# Patient Record
Sex: FEMALE | Race: WHITE | Employment: FULL TIME | URBAN - METROPOLITAN AREA
[De-identification: names, ages, dates, MRNs, and addresses within clinical notes are randomized per-mention and may not be internally consistent; named-entity substitution may affect disease eponyms.]

---

## 2017-01-23 PROBLEM — F41.9 ANXIETY: Status: ACTIVE | Noted: 2017-01-23

## 2017-01-23 PROBLEM — E66.9 OBESITY: Chronic | Status: ACTIVE | Noted: 2017-01-23

## 2017-01-23 PROBLEM — I48.91 ATRIAL FIBRILLATION WITH RVR (HCC): Status: ACTIVE | Noted: 2017-01-23

## 2019-04-21 ENCOUNTER — HOSPITAL ENCOUNTER (EMERGENCY)
Age: 54
Discharge: HOME | End: 2019-04-21
Attending: EMERGENCY MEDICINE
Payer: COMMERCIAL

## 2019-04-21 ENCOUNTER — APPOINTMENT (OUTPATIENT)
Dept: GENERAL RADIOLOGY | Age: 54
End: 2019-04-21
Payer: COMMERCIAL

## 2019-04-21 VITALS
HEIGHT: 67 IN | DIASTOLIC BLOOD PRESSURE: 81 MMHG | RESPIRATION RATE: 16 BRPM | TEMPERATURE: 97.8 F | SYSTOLIC BLOOD PRESSURE: 121 MMHG | BODY MASS INDEX: 45.99 KG/M2 | OXYGEN SATURATION: 97 % | WEIGHT: 293 LBS | HEART RATE: 54 BPM

## 2019-04-21 DIAGNOSIS — I48.0 PAROXYSMAL ATRIAL FIBRILLATION WITH RVR (HCC): Primary | ICD-10-CM

## 2019-04-21 DIAGNOSIS — F41.1 ANXIETY STATE: ICD-10-CM

## 2019-04-21 LAB
A/G RATIO: 1.1 (ref 1.1–2.2)
ALBUMIN SERPL-MCNC: 3.7 G/DL (ref 3.4–5)
ALP BLD-CCNC: 117 U/L (ref 40–129)
ALT SERPL-CCNC: 15 U/L (ref 10–40)
ANION GAP SERPL CALCULATED.3IONS-SCNC: 14 MMOL/L (ref 3–16)
AST SERPL-CCNC: 20 U/L (ref 15–37)
BASOPHILS ABSOLUTE: 0 K/UL (ref 0–0.2)
BASOPHILS RELATIVE PERCENT: 0.7 %
BILIRUB SERPL-MCNC: 0.4 MG/DL (ref 0–1)
BUN BLDV-MCNC: 12 MG/DL (ref 7–20)
CALCIUM SERPL-MCNC: 8.9 MG/DL (ref 8.3–10.6)
CHLORIDE BLD-SCNC: 106 MMOL/L (ref 99–110)
CO2: 21 MMOL/L (ref 21–32)
CREAT SERPL-MCNC: 0.6 MG/DL (ref 0.6–1.1)
EOSINOPHILS ABSOLUTE: 0.1 K/UL (ref 0–0.6)
EOSINOPHILS RELATIVE PERCENT: 1.2 %
GFR AFRICAN AMERICAN: >60
GFR NON-AFRICAN AMERICAN: >60
GLOBULIN: 3.5 G/DL
GLUCOSE BLD-MCNC: 101 MG/DL (ref 70–99)
HCT VFR BLD CALC: 41 % (ref 36–48)
HEMOGLOBIN: 13.7 G/DL (ref 12–16)
LYMPHOCYTES ABSOLUTE: 1.2 K/UL (ref 1–5.1)
LYMPHOCYTES RELATIVE PERCENT: 17.8 %
MAGNESIUM: 2.1 MG/DL (ref 1.8–2.4)
MCH RBC QN AUTO: 27.4 PG (ref 26–34)
MCHC RBC AUTO-ENTMCNC: 33.3 G/DL (ref 31–36)
MCV RBC AUTO: 82.4 FL (ref 80–100)
MONOCYTES ABSOLUTE: 0.4 K/UL (ref 0–1.3)
MONOCYTES RELATIVE PERCENT: 6.5 %
NEUTROPHILS ABSOLUTE: 4.9 K/UL (ref 1.7–7.7)
NEUTROPHILS RELATIVE PERCENT: 73.8 %
PDW BLD-RTO: 15.8 % (ref 12.4–15.4)
PLATELET # BLD: 229 K/UL (ref 135–450)
PMV BLD AUTO: 10.8 FL (ref 5–10.5)
POTASSIUM SERPL-SCNC: 3.8 MMOL/L (ref 3.5–5.1)
RBC # BLD: 4.98 M/UL (ref 4–5.2)
SODIUM BLD-SCNC: 141 MMOL/L (ref 136–145)
TOTAL PROTEIN: 7.2 G/DL (ref 6.4–8.2)
TROPONIN: <0.01 NG/ML
WBC # BLD: 6.7 K/UL (ref 4–11)

## 2019-04-21 PROCEDURE — 83735 ASSAY OF MAGNESIUM: CPT

## 2019-04-21 PROCEDURE — 93005 ELECTROCARDIOGRAM TRACING: CPT | Performed by: EMERGENCY MEDICINE

## 2019-04-21 PROCEDURE — 2500000003 HC RX 250 WO HCPCS: Performed by: EMERGENCY MEDICINE

## 2019-04-21 PROCEDURE — 6370000000 HC RX 637 (ALT 250 FOR IP): Performed by: EMERGENCY MEDICINE

## 2019-04-21 PROCEDURE — 71045 X-RAY EXAM CHEST 1 VIEW: CPT

## 2019-04-21 PROCEDURE — 96365 THER/PROPH/DIAG IV INF INIT: CPT

## 2019-04-21 PROCEDURE — 2580000003 HC RX 258: Performed by: EMERGENCY MEDICINE

## 2019-04-21 PROCEDURE — 80053 COMPREHEN METABOLIC PANEL: CPT

## 2019-04-21 PROCEDURE — 85025 COMPLETE CBC W/AUTO DIFF WBC: CPT

## 2019-04-21 PROCEDURE — 84484 ASSAY OF TROPONIN QUANT: CPT

## 2019-04-21 PROCEDURE — 2500000003 HC RX 250 WO HCPCS

## 2019-04-21 PROCEDURE — 99285 EMERGENCY DEPT VISIT HI MDM: CPT

## 2019-04-21 RX ORDER — LORAZEPAM 0.5 MG/1
0.5 TABLET ORAL ONCE
Status: COMPLETED | OUTPATIENT
Start: 2019-04-21 | End: 2019-04-21

## 2019-04-21 RX ORDER — DILTIAZEM HYDROCHLORIDE 5 MG/ML
INJECTION INTRAVENOUS
Status: COMPLETED
Start: 2019-04-21 | End: 2019-04-21

## 2019-04-21 RX ORDER — DILTIAZEM HYDROCHLORIDE 5 MG/ML
10 INJECTION INTRAVENOUS ONCE
Status: COMPLETED | OUTPATIENT
Start: 2019-04-21 | End: 2019-04-21

## 2019-04-21 RX ORDER — HYDROXYZINE HYDROCHLORIDE 25 MG/1
25 TABLET, FILM COATED ORAL EVERY 6 HOURS PRN
Qty: 12 TABLET | Refills: 0 | Status: SHIPPED | OUTPATIENT
Start: 2019-04-21 | End: 2019-04-21 | Stop reason: SDUPTHER

## 2019-04-21 RX ORDER — HYDROXYZINE HYDROCHLORIDE 25 MG/1
25 TABLET, FILM COATED ORAL EVERY 6 HOURS PRN
Qty: 12 TABLET | Refills: 0 | Status: SHIPPED | OUTPATIENT
Start: 2019-04-21 | End: 2019-04-24

## 2019-04-21 RX ADMIN — DILTIAZEM HYDROCHLORIDE 10 MG/HR: 5 INJECTION INTRAVENOUS at 11:22

## 2019-04-21 RX ADMIN — DILTIAZEM HYDROCHLORIDE 10 MG: 5 INJECTION INTRAVENOUS at 11:18

## 2019-04-21 RX ADMIN — LORAZEPAM 0.5 MG: 0.5 TABLET ORAL at 11:58

## 2019-04-21 ASSESSMENT — PAIN DESCRIPTION - LOCATION: LOCATION: CHEST

## 2019-04-21 ASSESSMENT — PAIN SCALES - GENERAL: PAINLEVEL_OUTOF10: 5

## 2019-04-21 NOTE — ED PROVIDER NOTES
Emergency Department Provider Note     Location: [address] [place] [address]  ED  4/21/2019    I independently performed a history and physical on [place]. All diagnostic, treatment, and disposition decisions were made by myself in conjunction with the mid-level provider. Briefly, this is a 47 y.o. female here for sudden onset of fast and irregular HR at 9:20am.  ppatient has a history of atrial fibrillation status post ablation done in 2017 by [address] [place] [address] Dr lincoln. She states this is probably the third episode since the ablation. She is confident the A. Fib started this morning. She does not normally take any medicine on daily basis. She states she always converts with chemical cardioversion but can take up to 12 hours. ED Triage Vitals [04/21/19 1100]   BP Temp Temp Source Pulse Resp SpO2 Height Weight   (!) 138/100 97.8 °F (36.6 °C) Oral 166 16 99 % 5' 7\" (1.702 m) (!) 301 lb (136.5 kg)        Exam showed WDWN female NAD, ACOx3, fast, irregularly irregular, lungs clear, no LE edema. Repeat exam after patient cardioverted - RRR, no murmur. Patient seen and examined in room 14    EKG  The Ekg interpreted by me in the absence of a cardiologist shows. atrial fibrillation with a rate of 169  Axis is   Normal  QTc is  within an acceptable range  ST segment depression diffusely likely related to rate. No evidence of acute ischemia. Compare to prior EKG dated January 23, 2017, patient is in A. Fib with RVR. EKG#2  The Ekg interpreted by me in the absence of a cardiologist shows. normal sinus rhythm with a rate of 60  Axis is   Normal  QTc is  normal  Intervals and Durations are unremarkable. No specific ST-T wave changes appreciated. ST segment normal now that rate is normal.  No evidence of acute ischemia.    No significant change from prior EKG dated 1/23/17     Diltiazem drip turned off after patient cardioverted      Xr Chest Portable    Result Date: 4/21/2019  EXAMINATION: SINGLE XRAY VIEW OF

## 2019-04-21 NOTE — ED PROVIDER NOTES
[facility]  ED      CHIEF COMPLAINT  Irregular Heart Beat (was making breakfast and felt her heart go into an irregular rhythm)    HISTORY OF PRESENT ILLNESS  Lilly Salas is a 47 y.o. female who presents to the ED complaining of feeling like going into atrial fib. Has a history of a fib and can feel when it happens. Does have a little bit of chest pain and a little bit of heaviness. Also with a little bit of SOB and dizziness. Dr. Candace Watkins is EP cardiology, Dr. Luis Manuel Kapoor is cards. She had an ablation in 2017. Has gone back into a fib 3 times since and has been converted with medications. States she usually has about 8-12 hours of medication before she converts and she stays overnight. She is not on anticoagulation currently. No current meds at home. Reports nausea, no vomiting. The patient is currently rating their pain as 5/10 and describes it as a pressure type of pain. No treatments have been tried prior to arrival in the ED. The patient denies other complaints, modifying factors or associated symptoms. The patient arrived to the ED via private car. Patient is accompanied by family who is bedside for the visit. PAST MEDICAL HISTORY    Past Medical History:   Diagnosis Date    Atrial fib/flutter, transient     Hypertension        SURGICAL HISTORY    Past Surgical History:   Procedure Laterality Date    CHOLECYSTECTOMY      GASTRIC BYPASS SURGERY         CURRENT MEDICATIONS    None    ALLERGIES    Allergies   Allergen Reactions    Codeine     Floxin [Ofloxacin]     Morphine     Penicillins        FAMILY HISTORY    History reviewed. No pertinent family history.     SOCIAL HISTORY    Social History     Socioeconomic History    Marital status: Legally      Spouse name: None    Number of children: None    Years of education: None    Highest education level: None   Occupational History    None   Social Needs    Financial resource strain: None   Clayhole-Graham insecurity:     Worry: None     Inability: None    Transportation needs:     Medical: None     Non-medical: None   Tobacco Use    Smoking status: Never Smoker    Smokeless tobacco: Never Used   Substance and Sexual Activity    Alcohol use: No    Drug use: No    Sexual activity: Yes   Lifestyle    Physical activity:     Days per week: None     Minutes per session: None    Stress: None   Relationships    Social connections:     Talks on phone: None     Gets together: None     Attends [employer] service: None     Active member of club or organization: None     Attends meetings of clubs or organizations: None     Relationship status: None    Intimate partner violence:     Fear of current or ex partner: None     Emotionally abused: None     Physically abused: None     Forced sexual activity: None   Other Topics Concern    None   Social History Narrative    None       REVIEW OF SYSTEMS    10 systems reviewed, pertinent positives per HPI otherwise noted to be negative    PHYSICAL EXAM  Vitals:    04/21/19 1303   BP: 121/81   Pulse: 54   Resp: 16   Temp:    SpO2: 97%       GENERAL: Patient is well-developed, well-nourished. Awake and alert. Cooperative. Resting in bed. No apparent distress. Does appear slightly anxious. HEENT:  Normocephalic, atraumatic. Conjunctiva appear normal. Sclera is non-icteric. External ears are normal.    NECK: Supple with normal ROM. Trachea midline. LUNGS: Equal and symmetric chest rise. Breathing is unlabored. Speaking comfortably in full sentences. Lungs are clear bilaterally to auscultation. Without wheezing, rales, or rhonchi. CADIOVASCULAR: Tachycardic rate and irregular rhythm. Normal S1-S2 sounds. No murmurs, rubs, or gallops. Capillary refill is brisk in all 4 extremities. Bilateral lower extremities are equal in size, there is no swelling observed. There is no tenderness to palpation. There is no erythema observed or warmth palpated.   GI: Soft, nontender, nondistended with positive bowel sounds. No rebound tenderness, guarding or any peritoneal signs. No masses or hepatosplenomegaly   MUSCULOSKELETAL:  No gross deformities or trauma noted. Moving all extremities equally and appropriately. Normal ROM. SKIN: Warm/dry. Skin is intact. No rashes/lesions noted. PSYCHIATRIC: Mood and affect appropriate. Speech is clear and articulate. NEUROLOGIC: Alert and oriented. No focal motor or sensory deficits. Gait was observed and is normal.    LABS  I have reviewed all labs for this visit.    Results for orders placed or performed during the hospital encounter of 04/21/19   CBC Auto Differential   Result Value Ref Range    WBC 6.7 4.0 - 11.0 K/uL    RBC 4.98 4.00 - 5.20 M/uL    Hemoglobin 13.7 12.0 - 16.0 g/dL    Hematocrit 41.0 36.0 - 48.0 %    MCV 82.4 80.0 - 100.0 fL    MCH 27.4 26.0 - 34.0 pg    MCHC 33.3 31.0 - 36.0 g/dL    RDW 15.8 (H) 12.4 - 15.4 %    Platelets 902 114 - 466 K/uL    MPV 10.8 (H) 5.0 - 10.5 fL    Neutrophils % 73.8 %    Lymphocytes % 17.8 %    Monocytes % 6.5 %    Eosinophils % 1.2 %    Basophils % 0.7 %    Neutrophils # 4.9 1.7 - 7.7 K/uL    Lymphocytes # 1.2 1.0 - 5.1 K/uL    Monocytes # 0.4 0.0 - 1.3 K/uL    Eosinophils # 0.1 0.0 - 0.6 K/uL    Basophils # 0.0 0.0 - 0.2 K/uL   Comprehensive Metabolic Panel   Result Value Ref Range    Sodium 141 136 - 145 mmol/L    Potassium 3.8 3.5 - 5.1 mmol/L    Chloride 106 99 - 110 mmol/L    CO2 21 21 - 32 mmol/L    Anion Gap 14 3 - 16    Glucose 101 (H) 70 - 99 mg/dL    BUN 12 7 - 20 mg/dL    CREATININE 0.6 0.6 - 1.1 mg/dL    GFR Non-African American >60 >60    GFR African American >60 >60    Calcium 8.9 8.3 - 10.6 mg/dL    Total Protein 7.2 6.4 - 8.2 g/dL    Alb 3.7 3.4 - 5.0 g/dL    Albumin/Globulin Ratio 1.1 1.1 - 2.2    Total Bilirubin 0.4 0.0 - 1.0 mg/dL    Alkaline Phosphatase 117 40 - 129 U/L    ALT 15 10 - 40 U/L    AST 20 15 - 37 U/L    Globulin 3.5 g/dL   Troponin   Result Value Ref Range    Troponin <0.01 <0.01 ng/mL   Magnesium   Result Value Ref Range    Magnesium 2.10 1.80 - 2.40 mg/dL   EKG 12 Lead   Result Value Ref Range    Ventricular Rate 169 BPM    Atrial Rate 169 BPM    P-R Interval 158 ms    QRS Duration 82 ms    Q-T Interval 276 ms    QTc Calculation (Bazett) 462 ms    P Axis 23 degrees    R Axis 21 degrees    T Axis 255 degrees    Diagnosis       Sinus tachycardia with Premature supraventricular complexes with frequent Premature ventricular complexes and Fusion complexesMarked ST abnormality, possible inferior subendocardial injuryAbnormal ECGWhen compared with ECG of 23-JAN-2017 17:10,Significant changes have occurred   EKG 12 Lead   Result Value Ref Range    Ventricular Rate 60 BPM    Atrial Rate 60 BPM    P-R Interval 156 ms    QRS Duration 84 ms    Q-T Interval 396 ms    QTc Calculation (Bazett) 396 ms    P Axis 54 degrees    R Axis 11 degrees    T Axis 35 degrees    Diagnosis       Normal sinus rhythmNormal ECGWhen compared with ECG of 21-APR-2019 11:03,Significant changes have occurred       RADIOLOGY    Xr Chest Portable    Result Date: 4/21/2019  EXAMINATION: SINGLE XRAY VIEW OF THE CHEST 4/21/2019 11:44 am COMPARISON: 01/22/2017 HISTORY: ORDERING SYSTEM PROVIDED HISTORY: sudden onset of a-fib, SOB TECHNOLOGIST PROVIDED HISTORY: Reason for exam:->sudden onset of a-fib, SOB Ordering Physician Provided Reason for Exam: sudden onset of a-fib, SOB Acuity: Unknown Type of Exam: Unknown FINDINGS: The heart size is within normal limits. There is no pneumothorax, edema or focal consolidation. A calcified granuloma is re-identified on the right. The bony thorax is grossly intact. No evidence of acute cardiopulmonary disease. ED COURSE/MDM  Patient seen and evaluated. Old records reviewed. Diagnostic testing reviewed and results discussed.       I have seen and evaluated this patient with supervising physician: Naye Nicholas MD. We thoroughly discussed the history, physical exam, diagnostic testing, emergency department course, plan and disposition. Paulie Hsieh presented to the ED today with above noted complaints. Physical exam is unremarkable except for patient is noted to be an atrial fib with an RVR rates fluctuating between 120-160. Patient was started on a Cardizem bolus and drip while in the ED. CBC is without evidence for systemic infection as there is no leukocytosis or differential shift. H&H is normal and stable. Platelets are within normal limits. CMP is without electrolyte abnormality. No evidence of kidney or liver dysfunction. Magnesium level is normal at 2.1. EKG initially shows atrial fibrillation with a rate of 169. After patient converted a repeat EKG was obtained and shows normal sinus rhythm with a rate of 60. Troponin negative  CXR is without acute findings. Pt was given the following medications or treatments in the ED:   Medications   diltiazem injection 10 mg (10 mg Intravenous Given 4/21/19 1118)     Followed by   diltiazem 125 mg in dextrose 5 % 125 mL infusion (0 mg/hr Intravenous Stopped 4/21/19 1158)   LORazepam (ATIVAN) tablet 0.5 mg (0.5 mg Oral Given 4/21/19 1158)     Patient has a history of paroxysmal atrial fibrillation, she had an ablation in 2017. States she has had approximately 3 episodes since then where she is gone into A. fib, these have been converted with medications. While patient was in the ED after the Cardizem bolus and drip were given she converted back into normal sinus rhythm. As these are occurring infrequently we will not make any medication adjustments at this time. Patient was advised to follow-up with her cardiologist, instructed to call tomorrow to schedule follow-up appointment. Patient will be discharged with a prescription for Vistaril as she was somewhat anxious while here in the ED. At this point I do not feel the patient requires further work up and it is reasonable to discharge the patient.      Please refer as soon as possible for a visit       Your cardiologist with [facility]    Schedule an appointment as soon as possible for a visit         DISPOSITION  Patient was discharged to home in good condition. Comment: Please note this report has been produced using speech recognition software and may contain errors related to that system including errors in grammar, punctuation, and spelling, as well as words and phrases that may be inappropriate. If there are any questions or concerns please feel free to contact the dictating provider for clarification.         Norma Bennett, LORE - CNP  04/21/19 7229

## 2019-04-21 NOTE — ED NOTES
Pt converted to sinus rhythm. Dr. Everardo Olguin notified and EKG ordered.      Nicole Rojas RN  04/21/19 6224

## 2019-04-22 LAB
EKG ATRIAL RATE: 169 BPM
EKG ATRIAL RATE: 60 BPM
EKG DIAGNOSIS: NORMAL
EKG DIAGNOSIS: NORMAL
EKG P AXIS: 23 DEGREES
EKG P AXIS: 54 DEGREES
EKG P-R INTERVAL: 156 MS
EKG P-R INTERVAL: 158 MS
EKG Q-T INTERVAL: 276 MS
EKG Q-T INTERVAL: 396 MS
EKG QRS DURATION: 82 MS
EKG QRS DURATION: 84 MS
EKG QTC CALCULATION (BAZETT): 396 MS
EKG QTC CALCULATION (BAZETT): 462 MS
EKG R AXIS: 11 DEGREES
EKG R AXIS: 21 DEGREES
EKG T AXIS: 255 DEGREES
EKG T AXIS: 35 DEGREES
EKG VENTRICULAR RATE: 169 BPM
EKG VENTRICULAR RATE: 60 BPM

## 2019-04-22 PROCEDURE — 93010 ELECTROCARDIOGRAM REPORT: CPT | Performed by: INTERNAL MEDICINE

## 2020-04-28 ENCOUNTER — HOSPITAL ENCOUNTER (EMERGENCY)
Age: 55
Discharge: HOME | End: 2020-04-28
Attending: EMERGENCY MEDICINE
Payer: COMMERCIAL

## 2020-04-28 ENCOUNTER — APPOINTMENT (OUTPATIENT)
Dept: GENERAL RADIOLOGY | Age: 55
End: 2020-04-28
Payer: COMMERCIAL

## 2020-04-28 VITALS
SYSTOLIC BLOOD PRESSURE: 152 MMHG | OXYGEN SATURATION: 100 % | HEART RATE: 75 BPM | HEIGHT: 67 IN | WEIGHT: 293 LBS | DIASTOLIC BLOOD PRESSURE: 90 MMHG | BODY MASS INDEX: 45.99 KG/M2 | TEMPERATURE: 98.6 F | RESPIRATION RATE: 17 BRPM

## 2020-04-28 LAB
A/G RATIO: 1.2 (ref 1.1–2.2)
ALBUMIN SERPL-MCNC: 4.1 G/DL (ref 3.4–5)
ALP BLD-CCNC: 148 U/L (ref 40–129)
ALT SERPL-CCNC: 13 U/L (ref 10–40)
ANION GAP SERPL CALCULATED.3IONS-SCNC: 9 MMOL/L (ref 3–16)
AST SERPL-CCNC: 19 U/L (ref 15–37)
BASOPHILS ABSOLUTE: 0 K/UL (ref 0–0.2)
BASOPHILS RELATIVE PERCENT: 0.6 %
BILIRUB SERPL-MCNC: 0.3 MG/DL (ref 0–1)
BUN BLDV-MCNC: 21 MG/DL (ref 7–20)
CALCIUM SERPL-MCNC: 9.4 MG/DL (ref 8.3–10.6)
CHLORIDE BLD-SCNC: 106 MMOL/L (ref 99–110)
CO2: 26 MMOL/L (ref 21–32)
CREAT SERPL-MCNC: 0.6 MG/DL (ref 0.6–1.1)
EKG ATRIAL RATE: 87 BPM
EKG DIAGNOSIS: NORMAL
EKG P AXIS: 41 DEGREES
EKG P-R INTERVAL: 146 MS
EKG Q-T INTERVAL: 360 MS
EKG QRS DURATION: 84 MS
EKG QTC CALCULATION (BAZETT): 433 MS
EKG R AXIS: 15 DEGREES
EKG T AXIS: 42 DEGREES
EKG VENTRICULAR RATE: 87 BPM
EOSINOPHILS ABSOLUTE: 0.1 K/UL (ref 0–0.6)
EOSINOPHILS RELATIVE PERCENT: 1.7 %
GFR AFRICAN AMERICAN: >60
GFR NON-AFRICAN AMERICAN: >60
GLOBULIN: 3.3 G/DL
GLUCOSE BLD-MCNC: 103 MG/DL (ref 70–99)
HCT VFR BLD CALC: 41.4 % (ref 36–48)
HEMOGLOBIN: 13.3 G/DL (ref 12–16)
LYMPHOCYTES ABSOLUTE: 2.6 K/UL (ref 1–5.1)
LYMPHOCYTES RELATIVE PERCENT: 32.1 %
MCH RBC QN AUTO: 26.4 PG (ref 26–34)
MCHC RBC AUTO-ENTMCNC: 32.1 G/DL (ref 31–36)
MCV RBC AUTO: 82.4 FL (ref 80–100)
MONOCYTES ABSOLUTE: 0.6 K/UL (ref 0–1.3)
MONOCYTES RELATIVE PERCENT: 7.2 %
NEUTROPHILS ABSOLUTE: 4.7 K/UL (ref 1.7–7.7)
NEUTROPHILS RELATIVE PERCENT: 58.4 %
PDW BLD-RTO: 15.4 % (ref 12.4–15.4)
PLATELET # BLD: 235 K/UL (ref 135–450)
PMV BLD AUTO: 10.7 FL (ref 5–10.5)
POTASSIUM REFLEX MAGNESIUM: 3.8 MMOL/L (ref 3.5–5.1)
RBC # BLD: 5.02 M/UL (ref 4–5.2)
SODIUM BLD-SCNC: 141 MMOL/L (ref 136–145)
TOTAL PROTEIN: 7.4 G/DL (ref 6.4–8.2)
TROPONIN: <0.01 NG/ML
WBC # BLD: 8 K/UL (ref 4–11)

## 2020-04-28 PROCEDURE — 93005 ELECTROCARDIOGRAM TRACING: CPT

## 2020-04-28 PROCEDURE — 80053 COMPREHEN METABOLIC PANEL: CPT

## 2020-04-28 PROCEDURE — 6370000000 HC RX 637 (ALT 250 FOR IP): Performed by: EMERGENCY MEDICINE

## 2020-04-28 PROCEDURE — 85025 COMPLETE CBC W/AUTO DIFF WBC: CPT

## 2020-04-28 PROCEDURE — 99285 EMERGENCY DEPT VISIT HI MDM: CPT

## 2020-04-28 PROCEDURE — 71045 X-RAY EXAM CHEST 1 VIEW: CPT

## 2020-04-28 PROCEDURE — 84484 ASSAY OF TROPONIN QUANT: CPT

## 2020-04-28 RX ORDER — ALPRAZOLAM 1 MG/1
1 TABLET ORAL 2 TIMES DAILY PRN
Qty: 5 TABLET | Refills: 0 | Status: SHIPPED | OUTPATIENT
Start: 2020-04-28 | End: 2020-04-30

## 2020-04-28 RX ORDER — ALPRAZOLAM 1 MG/1
1 TABLET ORAL ONCE
Status: COMPLETED | OUTPATIENT
Start: 2020-04-28 | End: 2020-04-28

## 2020-04-28 RX ADMIN — ALPRAZOLAM 1 MG: 1 TABLET ORAL at 01:33

## 2020-04-28 ASSESSMENT — PAIN DESCRIPTION - ONSET: ONSET: ON-GOING

## 2020-04-28 ASSESSMENT — PAIN SCALES - GENERAL
PAINLEVEL_OUTOF10: 3
PAINLEVEL_OUTOF10: 0
PAINLEVEL_OUTOF10: 0

## 2020-04-28 ASSESSMENT — PAIN DESCRIPTION - DESCRIPTORS: DESCRIPTORS: TINGLING

## 2020-04-28 ASSESSMENT — PAIN DESCRIPTION - PROGRESSION: CLINICAL_PROGRESSION: NOT CHANGED

## 2020-04-28 ASSESSMENT — PAIN DESCRIPTION - ORIENTATION: ORIENTATION: LEFT

## 2020-04-28 ASSESSMENT — PAIN DESCRIPTION - LOCATION: LOCATION: CHEST;ARM

## 2020-04-28 ASSESSMENT — PAIN DESCRIPTION - FREQUENCY: FREQUENCY: CONTINUOUS

## 2020-04-28 ASSESSMENT — PAIN DESCRIPTION - PAIN TYPE: TYPE: ACUTE PAIN

## 2020-04-28 NOTE — ED PROVIDER NOTES
dictating, effort is made to correct spelling/grammar errors. If there are any questions or concerns please feel free to contact the dictating provider for clarification.      Concepcion Black DO  ATTENDING, EMERGENCY MEDICINE        Ishmael Horvath DO  05/03/20 2626

## 2024-07-10 ENCOUNTER — APPOINTMENT (OUTPATIENT)
Dept: GENERAL RADIOLOGY | Age: 59
End: 2024-07-10
Payer: COMMERCIAL

## 2024-07-10 ENCOUNTER — HOSPITAL ENCOUNTER (OUTPATIENT)
Age: 59
Setting detail: OBSERVATION
Discharge: HOME | End: 2024-07-11
Attending: EMERGENCY MEDICINE | Admitting: HOSPITALIST
Payer: COMMERCIAL

## 2024-07-10 DIAGNOSIS — R07.89 CHEST PRESSURE: ICD-10-CM

## 2024-07-10 DIAGNOSIS — R03.0 ELEVATED BLOOD PRESSURE READING: ICD-10-CM

## 2024-07-10 DIAGNOSIS — I48.91 ATRIAL FIBRILLATION WITH RAPID VENTRICULAR RESPONSE (HCC): Primary | ICD-10-CM

## 2024-07-10 LAB
ALBUMIN SERPL-MCNC: 3.7 G/DL (ref 3.4–5)
ALBUMIN/GLOB SERPL: 1.1 {RATIO} (ref 1.1–2.2)
ALP SERPL-CCNC: 117 U/L (ref 40–129)
ALT SERPL-CCNC: 17 U/L (ref 10–40)
ANION GAP SERPL CALCULATED.3IONS-SCNC: 10 MMOL/L (ref 3–16)
AST SERPL-CCNC: 25 U/L (ref 15–37)
BASOPHILS # BLD: 0.1 K/UL (ref 0–0.2)
BASOPHILS NFR BLD: 1.1 %
BILIRUB SERPL-MCNC: 0.3 MG/DL (ref 0–1)
BUN SERPL-MCNC: 14 MG/DL (ref 7–20)
CALCIUM SERPL-MCNC: 9 MG/DL (ref 8.3–10.6)
CHLORIDE SERPL-SCNC: 100 MMOL/L (ref 99–110)
CO2 SERPL-SCNC: 23 MMOL/L (ref 21–32)
CREAT SERPL-MCNC: 0.6 MG/DL (ref 0.6–1.1)
DEPRECATED RDW RBC AUTO: 15.6 % (ref 12.4–15.4)
EKG DIAGNOSIS: NORMAL
EKG Q-T INTERVAL: 282 MS
EKG QRS DURATION: 82 MS
EKG QTC CALCULATION (BAZETT): 470 MS
EKG R AXIS: 8 DEGREES
EKG T AXIS: -59 DEGREES
EKG VENTRICULAR RATE: 167 BPM
EOSINOPHIL # BLD: 0.1 K/UL (ref 0–0.6)
EOSINOPHIL NFR BLD: 1.5 %
GFR SERPLBLD CREATININE-BSD FMLA CKD-EPI: >90 ML/MIN/{1.73_M2}
GLUCOSE SERPL-MCNC: 89 MG/DL (ref 70–99)
HCT VFR BLD AUTO: 39.8 % (ref 36–48)
HGB BLD-MCNC: 13.1 G/DL (ref 12–16)
LYMPHOCYTES # BLD: 1.9 K/UL (ref 1–5.1)
LYMPHOCYTES NFR BLD: 31.3 %
MAGNESIUM SERPL-MCNC: 2 MG/DL (ref 1.8–2.4)
MCH RBC QN AUTO: 29.8 PG (ref 26–34)
MCHC RBC AUTO-ENTMCNC: 32.9 G/DL (ref 31–36)
MCV RBC AUTO: 90.5 FL (ref 80–100)
MONOCYTES # BLD: 0.5 K/UL (ref 0–1.3)
MONOCYTES NFR BLD: 7.6 %
NEUTROPHILS # BLD: 3.5 K/UL (ref 1.7–7.7)
NEUTROPHILS NFR BLD: 58.5 %
NT-PROBNP SERPL-MCNC: 324 PG/ML (ref 0–124)
PHOSPHATE SERPL-MCNC: 2.7 MG/DL (ref 2.5–4.9)
PLATELET # BLD AUTO: 180 K/UL (ref 135–450)
PMV BLD AUTO: 11.7 FL (ref 5–10.5)
POTASSIUM SERPL-SCNC: 4 MMOL/L (ref 3.5–5.1)
PROT SERPL-MCNC: 7.2 G/DL (ref 6.4–8.2)
RBC # BLD AUTO: 4.4 M/UL (ref 4–5.2)
SODIUM SERPL-SCNC: 133 MMOL/L (ref 136–145)
TROPONIN, HIGH SENSITIVITY: <6 NG/L (ref 0–14)
TROPONIN, HIGH SENSITIVITY: <6 NG/L (ref 0–14)
WBC # BLD AUTO: 6 K/UL (ref 4–11)

## 2024-07-10 PROCEDURE — 2500000003 HC RX 250 WO HCPCS: Performed by: EMERGENCY MEDICINE

## 2024-07-10 PROCEDURE — 96366 THER/PROPH/DIAG IV INF ADDON: CPT

## 2024-07-10 PROCEDURE — 93005 ELECTROCARDIOGRAM TRACING: CPT | Performed by: EMERGENCY MEDICINE

## 2024-07-10 PROCEDURE — 2580000003 HC RX 258: Performed by: HOSPITALIST

## 2024-07-10 PROCEDURE — 96361 HYDRATE IV INFUSION ADD-ON: CPT

## 2024-07-10 PROCEDURE — 6370000000 HC RX 637 (ALT 250 FOR IP): Performed by: HOSPITALIST

## 2024-07-10 PROCEDURE — 84443 ASSAY THYROID STIM HORMONE: CPT

## 2024-07-10 PROCEDURE — 6360000002 HC RX W HCPCS: Performed by: HOSPITALIST

## 2024-07-10 PROCEDURE — 85025 COMPLETE CBC W/AUTO DIFF WBC: CPT

## 2024-07-10 PROCEDURE — 84484 ASSAY OF TROPONIN QUANT: CPT

## 2024-07-10 PROCEDURE — G0378 HOSPITAL OBSERVATION PER HR: HCPCS

## 2024-07-10 PROCEDURE — 71045 X-RAY EXAM CHEST 1 VIEW: CPT

## 2024-07-10 PROCEDURE — 96365 THER/PROPH/DIAG IV INF INIT: CPT

## 2024-07-10 PROCEDURE — 84100 ASSAY OF PHOSPHORUS: CPT

## 2024-07-10 PROCEDURE — 96372 THER/PROPH/DIAG INJ SC/IM: CPT

## 2024-07-10 PROCEDURE — 83880 ASSAY OF NATRIURETIC PEPTIDE: CPT

## 2024-07-10 PROCEDURE — 93005 ELECTROCARDIOGRAM TRACING: CPT | Performed by: HOSPITALIST

## 2024-07-10 PROCEDURE — 2500000003 HC RX 250 WO HCPCS

## 2024-07-10 PROCEDURE — 83735 ASSAY OF MAGNESIUM: CPT

## 2024-07-10 PROCEDURE — 93010 ELECTROCARDIOGRAM REPORT: CPT | Performed by: INTERNAL MEDICINE

## 2024-07-10 PROCEDURE — 2580000003 HC RX 258: Performed by: EMERGENCY MEDICINE

## 2024-07-10 PROCEDURE — 99285 EMERGENCY DEPT VISIT HI MDM: CPT

## 2024-07-10 PROCEDURE — 6370000000 HC RX 637 (ALT 250 FOR IP)

## 2024-07-10 PROCEDURE — 96374 THER/PROPH/DIAG INJ IV PUSH: CPT

## 2024-07-10 PROCEDURE — 2060000000 HC ICU INTERMEDIATE R&B

## 2024-07-10 PROCEDURE — 80053 COMPREHEN METABOLIC PANEL: CPT

## 2024-07-10 RX ORDER — ONDANSETRON 4 MG/1
4 TABLET, ORALLY DISINTEGRATING ORAL EVERY 8 HOURS PRN
Status: DISCONTINUED | OUTPATIENT
Start: 2024-07-10 | End: 2024-07-11 | Stop reason: HOSPADM

## 2024-07-10 RX ORDER — SODIUM CHLORIDE 0.9 % (FLUSH) 0.9 %
5-40 SYRINGE (ML) INJECTION EVERY 12 HOURS SCHEDULED
Status: DISCONTINUED | OUTPATIENT
Start: 2024-07-10 | End: 2024-07-11 | Stop reason: HOSPADM

## 2024-07-10 RX ORDER — LORAZEPAM 1 MG/1
1 TABLET ORAL ONCE
Status: COMPLETED | OUTPATIENT
Start: 2024-07-10 | End: 2024-07-10

## 2024-07-10 RX ORDER — DILTIAZEM HYDROCHLORIDE 5 MG/ML
5 INJECTION INTRAVENOUS ONCE
Status: COMPLETED | OUTPATIENT
Start: 2024-07-10 | End: 2024-07-10

## 2024-07-10 RX ORDER — FUROSEMIDE 10 MG/ML
20 INJECTION INTRAMUSCULAR; INTRAVENOUS 2 TIMES DAILY
Status: DISCONTINUED | OUTPATIENT
Start: 2024-07-10 | End: 2024-07-11 | Stop reason: HOSPADM

## 2024-07-10 RX ORDER — 0.9 % SODIUM CHLORIDE 0.9 %
500 INTRAVENOUS SOLUTION INTRAVENOUS ONCE
Status: COMPLETED | OUTPATIENT
Start: 2024-07-10 | End: 2024-07-10

## 2024-07-10 RX ORDER — ENOXAPARIN SODIUM 100 MG/ML
30 INJECTION SUBCUTANEOUS 2 TIMES DAILY
Status: DISCONTINUED | OUTPATIENT
Start: 2024-07-10 | End: 2024-07-10 | Stop reason: ALTCHOICE

## 2024-07-10 RX ORDER — POTASSIUM CHLORIDE 20 MEQ/1
40 TABLET, EXTENDED RELEASE ORAL PRN
Status: DISCONTINUED | OUTPATIENT
Start: 2024-07-10 | End: 2024-07-11 | Stop reason: HOSPADM

## 2024-07-10 RX ORDER — POTASSIUM CHLORIDE 7.45 MG/ML
10 INJECTION INTRAVENOUS PRN
Status: DISCONTINUED | OUTPATIENT
Start: 2024-07-10 | End: 2024-07-11 | Stop reason: HOSPADM

## 2024-07-10 RX ORDER — SODIUM CHLORIDE 0.9 % (FLUSH) 0.9 %
5-40 SYRINGE (ML) INJECTION PRN
Status: DISCONTINUED | OUTPATIENT
Start: 2024-07-10 | End: 2024-07-11 | Stop reason: HOSPADM

## 2024-07-10 RX ORDER — ACETAMINOPHEN 325 MG/1
650 TABLET ORAL EVERY 6 HOURS PRN
Status: DISCONTINUED | OUTPATIENT
Start: 2024-07-10 | End: 2024-07-11 | Stop reason: HOSPADM

## 2024-07-10 RX ORDER — ONDANSETRON 2 MG/ML
4 INJECTION INTRAMUSCULAR; INTRAVENOUS EVERY 6 HOURS PRN
Status: DISCONTINUED | OUTPATIENT
Start: 2024-07-10 | End: 2024-07-11 | Stop reason: HOSPADM

## 2024-07-10 RX ORDER — ACETAMINOPHEN 650 MG/1
650 SUPPOSITORY RECTAL EVERY 6 HOURS PRN
Status: DISCONTINUED | OUTPATIENT
Start: 2024-07-10 | End: 2024-07-11 | Stop reason: HOSPADM

## 2024-07-10 RX ORDER — MAGNESIUM SULFATE IN WATER 40 MG/ML
2000 INJECTION, SOLUTION INTRAVENOUS PRN
Status: DISCONTINUED | OUTPATIENT
Start: 2024-07-10 | End: 2024-07-11 | Stop reason: HOSPADM

## 2024-07-10 RX ORDER — CHOLECALCIFEROL (VITAMIN D3) 1250 MCG
5000 CAPSULE ORAL WEEKLY
COMMUNITY
Start: 2024-04-18

## 2024-07-10 RX ORDER — SODIUM CHLORIDE 9 MG/ML
INJECTION, SOLUTION INTRAVENOUS PRN
Status: DISCONTINUED | OUTPATIENT
Start: 2024-07-10 | End: 2024-07-11 | Stop reason: HOSPADM

## 2024-07-10 RX ORDER — POLYETHYLENE GLYCOL 3350 17 G/17G
17 POWDER, FOR SOLUTION ORAL DAILY PRN
Status: DISCONTINUED | OUTPATIENT
Start: 2024-07-10 | End: 2024-07-11 | Stop reason: HOSPADM

## 2024-07-10 RX ADMIN — DILTIAZEM HYDROCHLORIDE 5 MG: 5 INJECTION, SOLUTION INTRAVENOUS at 11:14

## 2024-07-10 RX ADMIN — FUROSEMIDE 20 MG: 10 INJECTION, SOLUTION INTRAMUSCULAR; INTRAVENOUS at 18:19

## 2024-07-10 RX ADMIN — SODIUM CHLORIDE 500 ML: 9 INJECTION, SOLUTION INTRAVENOUS at 10:56

## 2024-07-10 RX ADMIN — APIXABAN 5 MG: 5 TABLET, FILM COATED ORAL at 20:11

## 2024-07-10 RX ADMIN — LORAZEPAM 1 MG: 1 TABLET ORAL at 12:04

## 2024-07-10 RX ADMIN — ENOXAPARIN SODIUM 30 MG: 100 INJECTION SUBCUTANEOUS at 13:41

## 2024-07-10 RX ADMIN — DILTIAZEM HYDROCHLORIDE 5 MG: 5 INJECTION, SOLUTION INTRAVENOUS at 10:52

## 2024-07-10 RX ADMIN — SODIUM CHLORIDE 5 MG/HR: 900 INJECTION, SOLUTION INTRAVENOUS at 11:33

## 2024-07-10 RX ADMIN — Medication 10 ML: at 20:13

## 2024-07-10 ASSESSMENT — PAIN DESCRIPTION - DESCRIPTORS
DESCRIPTORS: DISCOMFORT
DESCRIPTORS: TIGHTNESS

## 2024-07-10 ASSESSMENT — PAIN SCALES - GENERAL
PAINLEVEL_OUTOF10: 5
PAINLEVEL_OUTOF10: 8

## 2024-07-10 ASSESSMENT — LIFESTYLE VARIABLES
HOW OFTEN DO YOU HAVE A DRINK CONTAINING ALCOHOL: NEVER
HOW MANY STANDARD DRINKS CONTAINING ALCOHOL DO YOU HAVE ON A TYPICAL DAY: PATIENT DOES NOT DRINK

## 2024-07-10 ASSESSMENT — PAIN DESCRIPTION - LOCATION: LOCATION: CHEST

## 2024-07-10 ASSESSMENT — PAIN - FUNCTIONAL ASSESSMENT: PAIN_FUNCTIONAL_ASSESSMENT: 0-10

## 2024-07-10 NOTE — ED NOTES
Ms. Morales is a 59 y.o. female who had concerns including Chest Pain (Chest pain that started today. Patient states she thinks she went into afib yesterday but converted, started having chest pain after the episode yesterday. Hx afib. Received 324 asa PTA. ).    Chief Complaint   Patient presents with    Chest Pain     Chest pain that started today. Patient states she thinks she went into afib yesterday but converted, started having chest pain after the episode yesterday. Hx afib. Received 324 asa PTA.        She is being admitted for:    Atrial fibrillation with RVR (HCC)    Her ED problem list included:    1. Atrial fibrillation with rapid ventricular response (HCC)    2. Chest pressure    3. Elevated blood pressure reading        Past Medical History:   Diagnosis Date    Atrial fib/flutter, transient     Hypertension        Past Surgical History:   Procedure Laterality Date    CHOLECYSTECTOMY      GASTRIC BYPASS SURGERY         Her recent abnormal labs were:    Labs Reviewed   CBC WITH AUTO DIFFERENTIAL - Abnormal; Notable for the following components:       Result Value    RDW 15.6 (*)     MPV 11.7 (*)     All other components within normal limits   COMPREHENSIVE METABOLIC PANEL W/ REFLEX TO MG FOR LOW K - Abnormal; Notable for the following components:    Sodium 133 (*)     All other components within normal limits   TROPONIN   TROPONIN   PHOSPHORUS   MAGNESIUM   BRAIN NATRIURETIC PEPTIDE   TSH WITH REFLEX       Her vital signs for the encounter were:    Patient Vitals for the past 24 hrs:   BP Temp Temp src Pulse Resp SpO2 Height Weight   07/10/24 1723 92/68 -- -- (!) 48 20 97 % -- --   07/10/24 1707 94/65 -- -- 52 16 100 % -- --   07/10/24 1654 103/67 -- -- 58 22 98 % -- --   07/10/24 1649 -- -- -- (!) 48 15 100 % -- --   07/10/24 1644 -- -- -- (!) 106 17 100 % -- --   07/10/24 1620 105/80 -- -- 89 22 98 % -- --   07/10/24 1611 -- -- -- 95 -- -- -- --   07/10/24 1609 -- -- -- (!) 120 13 97 % -- --   07/10/24

## 2024-07-10 NOTE — ED NOTES
General Cardiology   Progress Note -  Team One   Sienaguillermina Bowerton 63 y.o. female MRN: [id number]    Unit/Bed#: S -Siobhan Encounter: [id number]    Assessment  1. Chest pain, elevated troponin / Likely NSTEMI type I  -No current complaints of chest pain.  HS troponin levels elevated; 48--[phone]--2074  ECG on admission demonstrated NSR with ST depression in leads I/aVL, and V2.  -On aspirin 81 mg daily + clopidogrel 75 mg daily.  -On atorvastatin 80 mg daily  -On metoprolol tartrate 25 mg twice daily  -On IV heparin GTT ACS low protocol.  2. Hypertension  -Average /76 last recorded 131/78, HR 62.  -Outpatient BP regimen; PERRL 10 mg daily  -Inpatient BP regimen; metoprolol tartrate 25 mg twice daily  3. Hyperlipidemia  -Lipid profile 6/2/2024; cholesterol 133, triglyceride 88, HDL 39 and LDL calculated 76.  -On atorvastatin 40 mg daily (home dose)    Plan  -Patient feels well this a.m., denies any specific cardiac or respiratory complaints.  -Tentative plan for [facility] procedure today for definitive ischemic evaluation.  Maintain n.p.o. status, IV fluids for hydration.  Continue IV heparin GTT ACS low protocol.  -Follow-up TTE imaging results.  Telemetry reviewed, maintaining sinus bradycardia with heart rates in the mid 50s.  Decrease metoprolol from 25 to 12.5 mg twice daily.   -Hold lisinopril.  -Continue DAPT with low-dose aspirin/clopidogrel 75 mg daily.  Increase atorvastatin to 80 mg daily, previously had been on 40 mg PTA, LDL suboptimal at 76.  -Continue to monitor on telemetry.    Subjective  Review of Systems   Constitutional: Negative for chills, fever and malaise/fatigue.   Eyes:  Negative for visual disturbance.   Cardiovascular:  Negative for chest pain, dyspnea on exertion, leg swelling, orthopnea and palpitations.   Respiratory:  Negative for cough and shortness of breath.    Neurological:  Negative for dizziness, headaches and light-headedness.       Objective:   Physical Exam  Vitals  Meal tray ordered for patient at this time. Diet order in place.    "and nursing note reviewed.   Constitutional:       General: She is not in acute distress.     Appearance: She is not diaphoretic.   HENT:      Head: Normocephalic and atraumatic.   Eyes:      General: No scleral icterus.  Cardiovascular:      Rate and Rhythm: Regular rhythm. Bradycardia present.      Pulses: Normal pulses.      Heart sounds: Normal heart sounds.   Pulmonary:      Effort: Pulmonary effort is normal.      Breath sounds: Normal breath sounds. No wheezing or rales.   Abdominal:      Palpations: Abdomen is soft.   Musculoskeletal:      Right lower leg: No edema.      Left lower leg: No edema.   Skin:     General: Skin is warm and dry.      Capillary Refill: Capillary refill takes less than 2 seconds.   Neurological:      General: No focal deficit present.      Mental Status: She is alert and oriented to person, place, and time.   Psychiatric:         Mood and Affect: Mood normal.         Vitals: Blood pressure 131/78, pulse 62, temperature 98.2 °F (36.8 °C), resp. rate 18, height 5' 2\" (1.575 m), weight 64.9 kg (143 lb), SpO2 94%.,     Body mass index is 26.16 kg/m².,   Systolic (24hrs), Av , Min:124 , Max:136     Diastolic (24hrs), Av, Min:74, Max:78      Intake/Output Summary (Last 24 hours) at 6/3/2024 1112  Last data filed at 2024 2257  Gross per 24 hour   Intake 120 ml   Output 1475 ml   Net -1355 ml     Weight (last 2 days)       Date/Time Weight    24 0836 64.9 (143)    24 1629 64.9 (143.08)            LABORATORY RESULTS      CBC with diff:   Results from last 7 days   Lab Units 24  0454 24  0508 24  0037 24  2232 24  1626   WBC Thousand/uL 9.05 11.64*  --  10.56* 8.15   HEMOGLOBIN g/dL 11.6 11.8  --  11.9 13.3   HEMATOCRIT % 35.2 34.6*  --  34.2* 39.3   MCV fL 93 90  --  89 91   PLATELETS Thousands/uL 228 247 233 246 264   RBC Million/uL 3.80* 3.84  --  3.84 4.31   MCH pg 30.5 30.7  --  31.0 30.9   MCHC g/dL 33.0 34.1  --  34.8 33.8   RDW % " "12.6 12.1  --  12.0 12.0   MPV fL 10.1 9.7 9.8 9.6 9.9   NRBC AUTO /100 WBCs  --   --   --   --  0       CMP:  Results from last 7 days   Lab Units 06/03/24  0454 06/02/24  0508 06/01/24  1626   POTASSIUM mmol/L 3.6 3.2* 3.8   CHLORIDE mmol/L 106 101 101   CO2 mmol/L 28 25 24   BUN mg/dL 10 13 14   CREATININE mg/dL 0.77 0.70 0.74   CALCIUM mg/dL 8.8 9.0 9.9   AST U/L  --   --  22   ALT U/L  --   --  21   ALK PHOS U/L  --   --  53   EGFR ml/min/1.73sq m 82 92 86       BMP:  Results from last 7 days   Lab Units 06/03/24  0454 06/02/24  0508 06/01/24  1626   POTASSIUM mmol/L 3.6 3.2* 3.8   CHLORIDE mmol/L 106 101 101   CO2 mmol/L 28 25 24   BUN mg/dL 10 13 14   CREATININE mg/dL 0.77 0.70 0.74   CALCIUM mg/dL 8.8 9.0 9.9       No results found for: \"NTBNP\"     Results from last 7 days   Lab Units 06/02/24  0508 06/01/24  2356   MAGNESIUM mg/dL 1.6* 1.6*       Results from last 7 days   Lab Units 06/02/24  0508   HEMOGLOBIN A1C % 5.8*             Results from last 7 days   Lab Units 06/01/24  2232   INR  1.06       Lipid Profile:   No results found for: \"CHOL\"  Lab Results   Component Value Date    HDL 39 (L) 06/02/2024    HDL 32 (L) 09/30/2021    HDL 31 (L) 06/04/2021     Lab Results   Component Value Date    LDLCALC 76 06/02/2024    LDLCALC 79 09/30/2021    LDLCALC 159 (H) 06/04/2021     Lab Results   Component Value Date    TRIG 88 06/02/2024    TRIG 175 (H) 09/30/2021    TRIG 351 (H) 06/04/2021       Cardiac testing:   No results found for this or any previous visit.    No results found for this or any previous visit.    No results found for this or any previous visit.    No valid procedures specified.  No results found for this or any previous visit.      Meds/Allergies   all current active meds have been reviewed and current meds:   Current Facility-Administered Medications   Medication Dose Route Frequency    acetaminophen (TYLENOL) tablet 975 mg  975 mg Oral Q6H PRN    ascorbic acid (VITAMIN C) tablet 1,000 mg  " 1,000 mg Oral Daily    aspirin chewable tablet 81 mg  81 mg Oral Daily    atorvastatin (LIPITOR) tablet 80 mg  80 mg Oral Daily    Cholecalciferol (VITAMIN D3) tablet 1,000 Units  1,000 Units Oral Daily    clopidogrel (PLAVIX) tablet 75 mg  75 mg Oral Daily    dicyclomine (BENTYL) tablet 20 mg  20 mg Oral 4x Daily PRN    heparin (porcine) 25,000 units in 0.45% NaCl 250 mL infusion (premix)  3-20 Units/kg/hr (Order-Specific) Intravenous Titrated    heparin (porcine) injection 1,950 Units  1,950 Units Intravenous Q6H PRN    heparin (porcine) injection 3,900 Units  3,900 Units Intravenous Q6H PRN    levothyroxine tablet 50 mcg  50 mcg Oral Early Morning    loperamide (IMODIUM) capsule 2 mg  2 mg Oral TID PRN    meclizine (ANTIVERT) tablet 25 mg  25 mg Oral Q8H    metoprolol tartrate (LOPRESSOR) tablet 25 mg  25 mg Oral Q12H RUDDY    ondansetron (ZOFRAN) injection 4 mg  4 mg Intravenous Q8H PRN    pantoprazole (PROTONIX) injection 40 mg  40 mg Intravenous Q12H RUDDY    sucralfate (CARAFATE) tablet 1 g  1 g Oral 4x Daily (AC & HS)    vitamin E (TOCOPHEROL) capsule 400 Units  400 Units Oral Daily     heparin (porcine), 3-20 Units/kg/hr (Order-Specific), Last Rate: 12 Units/kg/hr (06/03/24 0719)        Counseling / Coordination of Care  Total floor / unit time spent today 20 minutes.  Greater than 50% of total time was spent with the patient and / or family counseling and / or coordination of care.      ** Please Note: Dragon 360 Dictation voice to text software may have been used in the creation of this document. **

## 2024-07-10 NOTE — PROGRESS NOTES
Patient admitted to room 219 from ED.  Patient oriented to room, call light, bed rails, phone, lights and bathroom.  Patient instructed about the schedule of the day including: vital sign frequency, lab draws, possible tests, frequency of MD and staff rounds, including RN/MD rounding together at bedside, daily weights, and I &O's.  Patient instructed about prescribed diet, how to use 8MENU, and television.  Telemetry box  in place, patient aware of placement and reason.  Bed locked, in lowest position, side rails up 2/4, call light within reach.  Will continue to monitor.        Vitals:    07/10/24 1809   BP: 116/77   Pulse: (!) 46   Resp: 16   Temp: 97.7 °F (36.5 °C)   SpO2: 97%

## 2024-07-10 NOTE — H&P
V2.0  History and Physical      Name:  Lorna Morales /Age/Sex: 1965  (59 y.o. female)   MRN & CSN:  [id number] & [id number] Encounter Date/Time: 7/10/2024 3:23 PM EDT   Location:   PCP: Ras Wong MD       Hospital Day: 1    Assessment and Plan:     Patient is a 59-year-old female past medical history of A-fib who presented to the ED with A-fib with RVR.    #.  Atrial fibrillation with rapid ventricular response  #.  Lower extremity edema    Plan:  Patient has a history of A-fib, anticoagulated with Eliquis  Started on Cardizem drip: Continue  Start IV Lasix twice daily  Consult cardiology      DVT Prophylaxis: Eliquis  Code status: full support            Chief Complain:    Palpitations.   History of Present Illness:     Patient is a 59-year-old female past medical history of A-fib, anticoagulated with Eliquis who presented to ED with shortness of breath and A-fib with RVR.  Patient states she went into A-fib yesterday but felt as if she converted.  She states she has been started having chest pain after that episode and then presented to the ER for further evaluation.  Upon further questioning, she denies any nausea, vomiting, belly pain.     Review of Systems:        Pertinent positives and negatives discussed in HPI     Objective:   No intake or output data in the 24 hours ending 07/10/24 1523   Vitals:   Vitals:    07/10/24 1509 07/10/24 1510 07/10/24 1511 07/10/24 1512   BP:       Pulse: 98 (!) 138 (!) 120 (!) 113   Resp:    Temp:       TempSrc:       SpO2: 97% 97% 99% 98%   Weight:       Height:           Medications Prior to Admission     Prior to Admission medications    Medication Sig Start Date End Date Taking? Authorizing Provider   apixaban (ELIQUIS) 5 MG TABS tablet Take 1 tablet by mouth 2 times daily 3/1/24  Yes Jane Paulino MD   Tirzepatide-Weight Management 5 MG/0.5ML SOAJ Inject 5 mg into the skin every 7 days 6/3/24  Yes Jane Paulino MD

## 2024-07-10 NOTE — ED PROVIDER NOTES
THIS IS MY KENDRICK SUPERVISORY AND SHARED VISIT NOTE:    I personally saw the patient and made/approved the management plan and take responsibility for the patient management.      I independently performed a history and physical on Lorna Morales.   All diagnostic, treatment, and disposition decisions were made by myself in conjunction with the advanced practice provider. I personally saw the patient and performed a substantive portion of the visit including all aspects of the medical decision making.      For further details of Lorna Morales's emergency department encounter, please see MAX Lewis's documentation.      History: Patient is a 59-year-old female presenting today due to concern for chest pain when she converted back into atrial fibrillation this morning although having some chest discomfort last night when she was in A-fib but stating that she did convert back to sinus rhythm in the evening.  She reports the pain as a pressure and fluttering sensation and no radiation anywhere.  She denies any strokelike symptoms.  She reports that normally she had been taking Eliquis as prescribed but over the last month has not been nearly as good about taking it although did take it this morning and did take a dose yesterday as well.  She denies any falls or trauma.  She did feel lightheaded earlier today but denies any syncope.  No vomiting or diarrhea.  No fever.  Due to concern for being persistently in atrial fibrillation and having chest pain, she came to the ED for further evaluation.  She did have a prior cardiac ablation.        Physical exam:   Gen: No acute distress.  Alert and answering questions appropriately.  Psych: Normal mood and affect  HEENT: NCAT, MMM  Neck: supple, normal range of motion  Cardiac: Tachycardia, irregularly irregular rhythm, pulses 2+ in upper extremities, no calf tenderness bilaterally  Lungs: C2AB, no R/R/W  Abdomen: soft and nontender with no R/D/G  Neuro: no focal neuro 
within normal range or not returned as of this dictation.     RADIOLOGY  Non-plain film images such as CT, U/S, and MRI are read by the radiologist.  Plain radiographic images are visualized and preliminarily interpreted by the ED Provider with the below findings:     No acute abnormalities.    Interpretation per the Radiologist below, if available at the time of this note:  XR CHEST PORTABLE   Final Result   No radiographic evidence of acute pulmonary disease.           PROCEDURES  Unless otherwise noted below, none.    ED COURSE/DDx/MDM  History obtained from:  Patient.    Vitals:  Vitals:    07/10/24 1738 07/10/24 1808 07/10/24 1809 07/10/24 2008   BP: 98/65  116/77 91/64   Pulse: (!) 47  (!) 46 52   Resp:   16 18   Temp:   97.7 °F (36.5 °C) 98.4 °F (36.9 °C)   TempSrc:   Oral Oral   SpO2: 97%  97% 98%   Weight:  (!) 139.2 kg (306 lb 14.4 oz)     Height:  1.702 m (5' 7\")       Patient received following medications in ED:  Medications   dilTIAZem 100 mg in sodium chloride 0.9 % 100 mL infusion ([facility]-[place]) (0 mg/hr IntraVENous Stopped 7/10/24 1708)   sodium chloride flush 0.9 % injection 5-40 mL (10 mLs IntraVENous Given 7/10/24 2013)   sodium chloride flush 0.9 % injection 5-40 mL (has no administration in time range)   0.9 % sodium chloride infusion (has no administration in time range)   potassium chloride (KLOR-CON M) extended release tablet 40 mEq (has no administration in time range)     Or   potassium bicarb-citric acid (EFFER-K) effervescent tablet 40 mEq (has no administration in time range)     Or   potassium chloride 10 mEq/100 mL IVPB (Peripheral Line) (has no administration in time range)   magnesium sulfate 2000 mg in 50 mL IVPB premix (has no administration in time range)   ondansetron (ZOFRAN-ODT) disintegrating tablet 4 mg (has no administration in time range)     Or   ondansetron (ZOFRAN) injection 4 mg (has no administration in time range)   polyethylene glycol (GLYCOLAX) packet 17 g (has no

## 2024-07-10 NOTE — ED NOTES
Writer started to titrate diltiazem drip q30 min by 2.5mg at 1611 because goal of therapy was obtained (HR<120). Patient HR decreased to the 50s at 1654. Writer sent message to hospitalist, EKG obtained, diltizem discontinued per order from hospitalist.

## 2024-07-10 NOTE — PLAN OF CARE
Problem: Discharge Planning  Goal: Discharge to home or other facility with appropriate resources  Recent Flowsheet Documentation  Taken 7/10/2024 1828 by Hernandez Gramajo RN  Discharge to home or other facility with appropriate resources: Identify barriers to discharge with patient and caregiver     Problem: Pain  Goal: Verbalizes/displays adequate comfort level or baseline comfort level  Outcome: Progressing  Note: Pt will be satisfied with pain control. Pt uses numeric pain rating scale with reassessments after pain med administration. Patient denies pain at this time. Will continue to monitor progression throughout shift.       Problem: ABCDS Injury Assessment  Goal: Absence of physical injury  Outcome: Progressing  Note: Pt will remain free from falls throughout hospital stay. Bed in lowest position with wheels locked and side rails 2/4 up. Hourly rounding completed. Patient ambulates safely independently, call light is within reach. Will continue to monitor throughout shift.

## 2024-07-11 VITALS
WEIGHT: 293 LBS | TEMPERATURE: 97.7 F | RESPIRATION RATE: 17 BRPM | DIASTOLIC BLOOD PRESSURE: 82 MMHG | HEART RATE: 54 BPM | BODY MASS INDEX: 45.99 KG/M2 | OXYGEN SATURATION: 100 % | HEIGHT: 67 IN | SYSTOLIC BLOOD PRESSURE: 120 MMHG

## 2024-07-11 LAB
EKG ATRIAL RATE: 47 BPM
EKG DIAGNOSIS: NORMAL
EKG P AXIS: 74 DEGREES
EKG P-R INTERVAL: 82 MS
EKG Q-T INTERVAL: 412 MS
EKG QRS DURATION: 88 MS
EKG QTC CALCULATION (BAZETT): 364 MS
EKG R AXIS: 0 DEGREES
EKG T AXIS: 17 DEGREES
EKG VENTRICULAR RATE: 47 BPM
TSH SERPL DL<=0.005 MIU/L-ACNC: 1.32 UIU/ML (ref 0.27–4.2)

## 2024-07-11 PROCEDURE — 99223 1ST HOSP IP/OBS HIGH 75: CPT | Performed by: INTERNAL MEDICINE

## 2024-07-11 PROCEDURE — G0378 HOSPITAL OBSERVATION PER HR: HCPCS

## 2024-07-11 PROCEDURE — 6370000000 HC RX 637 (ALT 250 FOR IP): Performed by: HOSPITALIST

## 2024-07-11 PROCEDURE — 2580000003 HC RX 258: Performed by: HOSPITALIST

## 2024-07-11 PROCEDURE — 93010 ELECTROCARDIOGRAM REPORT: CPT | Performed by: INTERNAL MEDICINE

## 2024-07-11 PROCEDURE — 6360000002 HC RX W HCPCS: Performed by: HOSPITALIST

## 2024-07-11 RX ADMIN — Medication 10 ML: at 08:21

## 2024-07-11 RX ADMIN — ACETAMINOPHEN 650 MG: 325 TABLET ORAL at 05:15

## 2024-07-11 RX ADMIN — FUROSEMIDE 20 MG: 10 INJECTION, SOLUTION INTRAMUSCULAR; INTRAVENOUS at 08:17

## 2024-07-11 RX ADMIN — ACETAMINOPHEN 650 MG: 325 TABLET ORAL at 12:32

## 2024-07-11 RX ADMIN — APIXABAN 5 MG: 5 TABLET, FILM COATED ORAL at 08:17

## 2024-07-11 ASSESSMENT — PAIN DESCRIPTION - PAIN TYPE: TYPE: ACUTE PAIN

## 2024-07-11 ASSESSMENT — PAIN DESCRIPTION - DESCRIPTORS
DESCRIPTORS: ACHING
DESCRIPTORS: ACHING

## 2024-07-11 ASSESSMENT — PAIN SCALES - GENERAL
PAINLEVEL_OUTOF10: 4
PAINLEVEL_OUTOF10: 4
PAINLEVEL_OUTOF10: 3

## 2024-07-11 ASSESSMENT — PAIN - FUNCTIONAL ASSESSMENT: PAIN_FUNCTIONAL_ASSESSMENT: ACTIVITIES ARE NOT PREVENTED

## 2024-07-11 ASSESSMENT — PAIN DESCRIPTION - LOCATION
LOCATION: HEAD
LOCATION: HEAD

## 2024-07-11 NOTE — CARE COORDINATION
Spoke with patient at bedside. She lives at home with her .  She is independent at home and drives.  She has a CPAP that she does use at night.   Denies any services.  She has insurance and a PCP.  She denies any needs from CM at this time.

## 2024-07-11 NOTE — PROGRESS NOTES
Pt d/c'd home.  Removed 1 PIV and stopped bleeding.  Catheter intact. Pt tolerated well. No redness noted at site.  Notified [facility] and removed tele box. Reviewed d/c instructions, home meds, and  f/u information utilizing teach-back method. Patient is Aox4, SB on RA, and all belongings gathered with . Patient was transferred via wheelchair to car with .

## 2024-07-11 NOTE — PLAN OF CARE
Problem: ABCDS Injury Assessment  Goal: Absence of physical injury  7/11/2024 0236 by Colin Amaro RN  Outcome: Progressing

## 2024-07-11 NOTE — PLAN OF CARE
Problem: Discharge Planning  Goal: Discharge to home or other facility with appropriate resources  Outcome: Progressing  Flowsheets (Taken 7/11/2024 0800)  Discharge to home or other facility with appropriate resources:   Identify barriers to discharge with patient and caregiver   Arrange for needed discharge resources and transportation as appropriate     Problem: Pain  Goal: Verbalizes/displays adequate comfort level or baseline comfort level  Outcome: Progressing  Flowsheets (Taken 7/11/2024 0800)  Verbalizes/displays adequate comfort level or baseline comfort level:   Assess pain using appropriate pain scale   Encourage patient to monitor pain and request assistance     Problem: ABCDS Injury Assessment  Goal: Absence of physical injury  7/11/2024 1116 by Jacqueline Cox, RN  Outcome: Progressing

## 2024-07-11 NOTE — DISCHARGE SUMMARY
V2.0  Discharge Summary    Name:  Lorna Morales /Age/Sex: 1965 (59 y.o. female)   Admit Date: 7/10/2024  Discharge Date: 24    MRN & CSN:  [id number] & [id number] Encounter Date and Time 24 3:04 PM EDT    Attending:  Froy Pollock MD Discharging Provider: Froy Pollock MD       Hospital Course:           The patient expressed appropriate understanding of, and agreement with the discharge recommendations, medications, and plan.     C      Discharge Instruction:     Primary care physician: Ras Wong MD within 2 weeks  Diet: cardiac diet   Activity: activity as tolerated  Disposition: Discharged to:   [x]Home, []HHC, []SNF, []Acute Rehab, []Hospice   Condition on discharge: Stable    Discharge Medications:        Medication List        CONTINUE taking these medications      apixaban 5 MG Tabs tablet  Commonly known as: ELIQUIS     Tirzepatide-Weight Management 5 MG/0.5ML Soaj     Vitamin D3 1.25 MG (49033 UT) Caps             Objective Findings at Discharge:   /79   Pulse (!) 49   Temp 97.7 °F (36.5 °C) (Oral)   Resp 18   Ht 1.702 m (5' 7\")   Wt (!) 138.7 kg (305 lb 11.2 oz)   SpO2 100%   BMI 47.88 kg/m²       Physical Exam:     General: awake, alert, and in NAD  Eyes: EOMI  ENT: neck supple  Cardiovascular: Regular rate and rhythm, normal S1 S2  Respiratory: Clear to auscultation  Gastrointestinal: Soft, non tender, BS+  Genitourinary: no suprapubic tenderness  Musculoskeletal: No edema  Skin: warm, dry  Neuro: Aox3, Cranial nerves grossly intake, nor focal motor or sensory deficit.   Psych: Mood appropriate.         Labs and Imaging   XR CHEST PORTABLE    Result Date: 7/10/2024  EXAMINATION: ONE XRAY VIEW OF THE CHEST 7/10/2024 10:55 am COMPARISON: Chest x-ray dated 2020. HISTORY: ORDERING SYSTEM PROVIDED HISTORY: chest pain TECHNOLOGIST PROVIDED HISTORY: Reason for exam:->chest pain Reason for Exam: cp FINDINGS: HEART/MEDIASTINUM: The cardiomediastinal silhouette is

## 2024-08-08 ENCOUNTER — HOSPITAL ENCOUNTER (OUTPATIENT)
Age: 59
Setting detail: OBSERVATION
LOS: 1 days | Discharge: HOME | End: 2024-08-09
Attending: STUDENT IN AN ORGANIZED HEALTH CARE EDUCATION/TRAINING PROGRAM | Admitting: INTERNAL MEDICINE
Payer: COMMERCIAL

## 2024-08-08 ENCOUNTER — APPOINTMENT (OUTPATIENT)
Dept: GENERAL RADIOLOGY | Age: 59
End: 2024-08-08
Payer: COMMERCIAL

## 2024-08-08 DIAGNOSIS — R06.02 SHORTNESS OF BREATH: ICD-10-CM

## 2024-08-08 DIAGNOSIS — I48.91 ATRIAL FIBRILLATION WITH RAPID VENTRICULAR RESPONSE (HCC): Primary | ICD-10-CM

## 2024-08-08 DIAGNOSIS — I48.0 PAROXYSMAL ATRIAL FIBRILLATION (HCC): ICD-10-CM

## 2024-08-08 LAB
ALBUMIN SERPL-MCNC: 3.8 G/DL (ref 3.4–5)
ALBUMIN/GLOB SERPL: 1.1 {RATIO} (ref 1.1–2.2)
ALP SERPL-CCNC: 133 U/L (ref 40–129)
ALT SERPL-CCNC: 11 U/L (ref 10–40)
ANION GAP SERPL CALCULATED.3IONS-SCNC: 12 MMOL/L (ref 3–16)
AST SERPL-CCNC: 18 U/L (ref 15–37)
BASOPHILS # BLD: 0 K/UL (ref 0–0.2)
BASOPHILS NFR BLD: 0.8 %
BILIRUB SERPL-MCNC: 0.3 MG/DL (ref 0–1)
BUN SERPL-MCNC: 15 MG/DL (ref 7–20)
CALCIUM SERPL-MCNC: 9.3 MG/DL (ref 8.3–10.6)
CHLORIDE SERPL-SCNC: 105 MMOL/L (ref 99–110)
CO2 SERPL-SCNC: 23 MMOL/L (ref 21–32)
CREAT SERPL-MCNC: 0.7 MG/DL (ref 0.6–1.1)
DEPRECATED RDW RBC AUTO: 14.4 % (ref 12.4–15.4)
EKG ATRIAL RATE: 46 BPM
EKG DIAGNOSIS: NORMAL
EKG DIAGNOSIS: NORMAL
EKG P AXIS: 37 DEGREES
EKG P-R INTERVAL: 160 MS
EKG Q-T INTERVAL: 282 MS
EKG Q-T INTERVAL: 420 MS
EKG QRS DURATION: 78 MS
EKG QRS DURATION: 90 MS
EKG QTC CALCULATION (BAZETT): 367 MS
EKG QTC CALCULATION (BAZETT): 460 MS
EKG R AXIS: 35 DEGREES
EKG R AXIS: 5 DEGREES
EKG T AXIS: 124 DEGREES
EKG T AXIS: 26 DEGREES
EKG VENTRICULAR RATE: 160 BPM
EKG VENTRICULAR RATE: 46 BPM
EOSINOPHIL # BLD: 0.1 K/UL (ref 0–0.6)
EOSINOPHIL NFR BLD: 2.3 %
GFR SERPLBLD CREATININE-BSD FMLA CKD-EPI: >90 ML/MIN/{1.73_M2}
GLUCOSE SERPL-MCNC: 87 MG/DL (ref 70–99)
HCT VFR BLD AUTO: 41.2 % (ref 36–48)
HGB BLD-MCNC: 13.6 G/DL (ref 12–16)
LYMPHOCYTES # BLD: 2.3 K/UL (ref 1–5.1)
LYMPHOCYTES NFR BLD: 39.7 %
MCH RBC QN AUTO: 29.9 PG (ref 26–34)
MCHC RBC AUTO-ENTMCNC: 33 G/DL (ref 31–36)
MCV RBC AUTO: 90.7 FL (ref 80–100)
MONOCYTES # BLD: 0.5 K/UL (ref 0–1.3)
MONOCYTES NFR BLD: 9 %
NEUTROPHILS # BLD: 2.7 K/UL (ref 1.7–7.7)
NEUTROPHILS NFR BLD: 48.2 %
NT-PROBNP SERPL-MCNC: 98 PG/ML (ref 0–124)
PLATELET # BLD AUTO: 201 K/UL (ref 135–450)
PMV BLD AUTO: 11.3 FL (ref 5–10.5)
POTASSIUM SERPL-SCNC: 3.8 MMOL/L (ref 3.5–5.1)
PROT SERPL-MCNC: 7.3 G/DL (ref 6.4–8.2)
RBC # BLD AUTO: 4.55 M/UL (ref 4–5.2)
SODIUM SERPL-SCNC: 140 MMOL/L (ref 136–145)
TROPONIN, HIGH SENSITIVITY: <6 NG/L (ref 0–14)
TROPONIN, HIGH SENSITIVITY: <6 NG/L (ref 0–14)
WBC # BLD AUTO: 5.7 K/UL (ref 4–11)

## 2024-08-08 PROCEDURE — 80053 COMPREHEN METABOLIC PANEL: CPT

## 2024-08-08 PROCEDURE — 99222 1ST HOSP IP/OBS MODERATE 55: CPT | Performed by: INTERNAL MEDICINE

## 2024-08-08 PROCEDURE — 6360000002 HC RX W HCPCS: Performed by: STUDENT IN AN ORGANIZED HEALTH CARE EDUCATION/TRAINING PROGRAM

## 2024-08-08 PROCEDURE — 1200000000 HC SEMI PRIVATE

## 2024-08-08 PROCEDURE — 71045 X-RAY EXAM CHEST 1 VIEW: CPT

## 2024-08-08 PROCEDURE — 84484 ASSAY OF TROPONIN QUANT: CPT

## 2024-08-08 PROCEDURE — 6370000000 HC RX 637 (ALT 250 FOR IP): Performed by: INTERNAL MEDICINE

## 2024-08-08 PROCEDURE — 83880 ASSAY OF NATRIURETIC PEPTIDE: CPT

## 2024-08-08 PROCEDURE — 93010 ELECTROCARDIOGRAM REPORT: CPT | Performed by: INTERNAL MEDICINE

## 2024-08-08 PROCEDURE — 2500000003 HC RX 250 WO HCPCS: Performed by: STUDENT IN AN ORGANIZED HEALTH CARE EDUCATION/TRAINING PROGRAM

## 2024-08-08 PROCEDURE — 96366 THER/PROPH/DIAG IV INF ADDON: CPT

## 2024-08-08 PROCEDURE — 6370000000 HC RX 637 (ALT 250 FOR IP): Performed by: STUDENT IN AN ORGANIZED HEALTH CARE EDUCATION/TRAINING PROGRAM

## 2024-08-08 PROCEDURE — 85025 COMPLETE CBC W/AUTO DIFF WBC: CPT

## 2024-08-08 PROCEDURE — 2580000003 HC RX 258: Performed by: INTERNAL MEDICINE

## 2024-08-08 PROCEDURE — 36415 COLL VENOUS BLD VENIPUNCTURE: CPT

## 2024-08-08 PROCEDURE — 93005 ELECTROCARDIOGRAM TRACING: CPT | Performed by: STUDENT IN AN ORGANIZED HEALTH CARE EDUCATION/TRAINING PROGRAM

## 2024-08-08 PROCEDURE — 96365 THER/PROPH/DIAG IV INF INIT: CPT

## 2024-08-08 PROCEDURE — 99285 EMERGENCY DEPT VISIT HI MDM: CPT

## 2024-08-08 PROCEDURE — 96375 TX/PRO/DX INJ NEW DRUG ADDON: CPT

## 2024-08-08 PROCEDURE — 2580000003 HC RX 258: Performed by: STUDENT IN AN ORGANIZED HEALTH CARE EDUCATION/TRAINING PROGRAM

## 2024-08-08 PROCEDURE — 93005 ELECTROCARDIOGRAM TRACING: CPT | Performed by: INTERNAL MEDICINE

## 2024-08-08 RX ORDER — ACETAMINOPHEN 650 MG/1
650 SUPPOSITORY RECTAL EVERY 6 HOURS PRN
Status: DISCONTINUED | OUTPATIENT
Start: 2024-08-08 | End: 2024-08-09 | Stop reason: HOSPADM

## 2024-08-08 RX ORDER — SODIUM CHLORIDE, SODIUM LACTATE, POTASSIUM CHLORIDE, AND CALCIUM CHLORIDE .6; .31; .03; .02 G/100ML; G/100ML; G/100ML; G/100ML
1000 INJECTION, SOLUTION INTRAVENOUS ONCE
Status: DISCONTINUED | OUTPATIENT
Start: 2024-08-08 | End: 2024-08-08

## 2024-08-08 RX ORDER — SODIUM CHLORIDE 0.9 % (FLUSH) 0.9 %
10 SYRINGE (ML) INJECTION PRN
Status: DISCONTINUED | OUTPATIENT
Start: 2024-08-08 | End: 2024-08-09 | Stop reason: HOSPADM

## 2024-08-08 RX ORDER — PROMETHAZINE HYDROCHLORIDE 25 MG/1
12.5 TABLET ORAL EVERY 6 HOURS PRN
Status: DISCONTINUED | OUTPATIENT
Start: 2024-08-08 | End: 2024-08-09 | Stop reason: HOSPADM

## 2024-08-08 RX ORDER — ACETAMINOPHEN 325 MG/1
650 TABLET ORAL EVERY 6 HOURS PRN
Status: DISCONTINUED | OUTPATIENT
Start: 2024-08-08 | End: 2024-08-09 | Stop reason: HOSPADM

## 2024-08-08 RX ORDER — 0.9 % SODIUM CHLORIDE 0.9 %
1000 INTRAVENOUS SOLUTION INTRAVENOUS ONCE
Status: COMPLETED | OUTPATIENT
Start: 2024-08-08 | End: 2024-08-08

## 2024-08-08 RX ORDER — DILTIAZEM HYDROCHLORIDE 5 MG/ML
10 INJECTION INTRAVENOUS ONCE
Status: COMPLETED | OUTPATIENT
Start: 2024-08-08 | End: 2024-08-08

## 2024-08-08 RX ORDER — LORAZEPAM 2 MG/ML
1 INJECTION INTRAMUSCULAR ONCE
Status: COMPLETED | OUTPATIENT
Start: 2024-08-08 | End: 2024-08-08

## 2024-08-08 RX ORDER — SODIUM CHLORIDE 9 MG/ML
INJECTION, SOLUTION INTRAVENOUS PRN
Status: DISCONTINUED | OUTPATIENT
Start: 2024-08-08 | End: 2024-08-09 | Stop reason: HOSPADM

## 2024-08-08 RX ORDER — NICOTINE 21 MG/24HR
1 PATCH, TRANSDERMAL 24 HOURS TRANSDERMAL DAILY
Status: DISCONTINUED | OUTPATIENT
Start: 2024-08-08 | End: 2024-08-08

## 2024-08-08 RX ORDER — ONDANSETRON 2 MG/ML
4 INJECTION INTRAMUSCULAR; INTRAVENOUS EVERY 6 HOURS PRN
Status: DISCONTINUED | OUTPATIENT
Start: 2024-08-08 | End: 2024-08-09 | Stop reason: HOSPADM

## 2024-08-08 RX ORDER — ASPIRIN 81 MG/1
324 TABLET, CHEWABLE ORAL ONCE
Status: COMPLETED | OUTPATIENT
Start: 2024-08-08 | End: 2024-08-08

## 2024-08-08 RX ORDER — SODIUM CHLORIDE 0.9 % (FLUSH) 0.9 %
10 SYRINGE (ML) INJECTION EVERY 12 HOURS SCHEDULED
Status: DISCONTINUED | OUTPATIENT
Start: 2024-08-08 | End: 2024-08-09 | Stop reason: HOSPADM

## 2024-08-08 RX ORDER — LANOLIN ALCOHOL/MO/W.PET/CERES
3 CREAM (GRAM) TOPICAL NIGHTLY
Status: DISCONTINUED | OUTPATIENT
Start: 2024-08-08 | End: 2024-08-09 | Stop reason: HOSPADM

## 2024-08-08 RX ADMIN — APIXABAN 5 MG: 5 TABLET, FILM COATED ORAL at 21:05

## 2024-08-08 RX ADMIN — SODIUM CHLORIDE 5 MG/HR: 900 INJECTION, SOLUTION INTRAVENOUS at 04:27

## 2024-08-08 RX ADMIN — DILTIAZEM HYDROCHLORIDE 10 MG: 5 INJECTION, SOLUTION INTRAVENOUS at 04:20

## 2024-08-08 RX ADMIN — SODIUM CHLORIDE, PRESERVATIVE FREE 10 ML: 5 INJECTION INTRAVENOUS at 21:05

## 2024-08-08 RX ADMIN — LORAZEPAM 1 MG: 2 INJECTION INTRAMUSCULAR; INTRAVENOUS at 04:30

## 2024-08-08 RX ADMIN — APIXABAN 5 MG: 5 TABLET, FILM COATED ORAL at 10:53

## 2024-08-08 RX ADMIN — SODIUM CHLORIDE 1000 ML: 9 INJECTION, SOLUTION INTRAVENOUS at 04:19

## 2024-08-08 RX ADMIN — ASPIRIN 81 MG 324 MG: 81 TABLET ORAL at 04:20

## 2024-08-08 RX ADMIN — SODIUM CHLORIDE, PRESERVATIVE FREE 10 ML: 5 INJECTION INTRAVENOUS at 10:54

## 2024-08-08 ASSESSMENT — PAIN SCALES - GENERAL
PAINLEVEL_OUTOF10: 7
PAINLEVEL_OUTOF10: 4

## 2024-08-08 ASSESSMENT — PAIN - FUNCTIONAL ASSESSMENT
PAIN_FUNCTIONAL_ASSESSMENT: NONE - DENIES PAIN
PAIN_FUNCTIONAL_ASSESSMENT: 0-10
PAIN_FUNCTIONAL_ASSESSMENT: NONE - DENIES PAIN

## 2024-08-08 NOTE — ED PROVIDER NOTES
I was not asked to see this patient.  I was available for consultation if deemed necessary.  I was only asked to interpret the EKG.  Please see Dr. Crum's note for care of the patient while in the emergency department and for final disposition.  Patient was already admitted at the time of this EKG and I was just asked to quickly look at it prior to going upstairs.      The Ekg interpreted by me shows  sinus bradycardia, rate=46    Axis is   Normal  QTc is  normal  Intervals and Durations are unremarkable.      ST Segments: nonspecific changes  Significant change from prior EKG dated - earlier today with EKG in afib with RVR with ST depressions noted   No longer with ST depressions now that she converted to sinus rhythm             Chapo Tai MD  08/08/24 0802

## 2024-08-08 NOTE — ED PROVIDER NOTES
[facility]  ED  EMERGENCY DEPARTMENT ENCOUNTER        Pt Name: Lorna Morales  MRN: [id number]  Birthdate 1965  Date of evaluation: 8/8/2024  Provider: Erasto Chow MD  PCP: Ras Wong MD  Note Started: 6:07 AM EDT 8/8/24    CHIEF COMPLAINT       Chief Complaint   Patient presents with    Chest Pain     Pt complains of chest discomfort and racing heart. 7/10 pain. Hx of a-fib. Woke her up out of her sleep       HISTORY OF PRESENT ILLNESS: 1 or more Elements     History from : Patient    Limitations to history : None    Lorna Morales is a 59 y.o. female who presents with palpitations, chest tightness, feels as though her heart rate is racing, woke her up from sleep, pain described as pressure, substernal.  No radiation.  No shortness of breath.  There is associated nausea.  Symptoms not otherwise alleviated or exacerbated by other factors.    Nursing Notes were all reviewed and agreed with or any disagreements were addressed in the HPI.    REVIEW OF SYSTEMS :      Positives and Pertinent negatives as per HPI.  ROS otherwise unremarkable.    SURGICAL HISTORY     Past Surgical History:   Procedure Laterality Date    CHOLECYSTECTOMY      GASTRIC BYPASS SURGERY         CURRENTMEDICATIONS       Previous Medications    APIXABAN (ELIQUIS) 5 MG TABS TABLET    Take 1 tablet by mouth 2 times daily    CHOLECALCIFEROL (VITAMIN D3) 1.25 MG (39914 UT) CAPS    Take 5,000 Units by mouth once a week Fridays    TIRZEPATIDE-WEIGHT MANAGEMENT 5 MG/0.5ML SOAJ    Inject 5 mg into the skin every 7 days Fridays       ALLERGIES     Codeine, Floxin [ofloxacin], Morphine, and Penicillins    FAMILYHISTORY     History reviewed. No pertinent family history.     SOCIAL HISTORY       Social History     Tobacco Use    Smoking status: Never    Smokeless tobacco: Never   Substance Use Topics    Alcohol use: No    Drug use: No       SCREENINGS        Yani Coma Scale  Eye Opening: Spontaneous  Best Verbal

## 2024-08-08 NOTE — CARE COORDINATION
discharge:      Financial    Payor: [employer] / Plan: [employer] / Product Type: *No Product type* /     Does insurance require precert for SNF: Yes    Potential assistance Purchasing Medications: No  Meds-to-Beds request: Yes      [facility] - [place], [place] - [address] - P [phone] - F [phone]  [address]  [facility] [id number]  Phone: [phone] Fax: [phone]    [facility] [id number] - [place], [place] - [address] [place] [address] -  [phone] - F [phone]  [address] [place] [address]  Phone: [phone] Fax: [phone]      Notes:    Factors facilitating achievement of predicted outcomes: Family support, Motivated, Cooperative, and Pleasant    Additional Case Management Notes: Spoke with patient at bedside. Patient lives at home with her .  She is independent at home, works and drives.   She has insurance and a PCP.  Likely no needs from .       IF APPLICABLE: The Patient and/or patient representative Lorna and her family were provided with a choice of provider and agrees with the discharge plan. Freedom of choice list with basic dialogue that supports the patient's individualized plan of care/goals and shares the quality data associated with the providers was provided to:     Patient Representative Name:       The Patient and/or Patient Representative Agree with the Discharge Plan?      Joann Gomez RN  Case Management Department  Ph: [phone]

## 2024-08-08 NOTE — H&P
input(s): \"LABA1C\" in the last 72 hours.  Recent Labs     08/08/24  0414   AST 18   ALT 11   BILITOT 0.3   ALKPHOS 133*     No results for input(s): \"INR\", \"LACTA\", \"TSH\" in the last 72 hours.     Sayda Trujillo MD

## 2024-08-08 NOTE — PLAN OF CARE
Problem: Discharge Planning  Goal: Discharge to home or other facility with appropriate resources  8/8/2024 1507 by Betty Garces, RN  Outcome: Progressing  8/8/2024 1507 by Betty Garces, RN  Outcome: Progressing     Problem: Safety - Adult  Goal: Free from fall injury  Outcome: Progressing

## 2024-08-08 NOTE — ED NOTES
218 @ 9017  
Patient up to bedside commode without difficulty.   
Pt converted ,, now sinus trinidad at 46, which pt states is normal when she converts. Christina paused and perfect serve sent to hospitalist  
Scale  Pain Scale: Pain Assessment  Pain Assessment: None - Denies Pain  Pain Level: 4  Last documented pain score (0-10 scale) Pain Level: 4  Last documented pain medication administered: na  Mental Status: oriented, alert, coherent, logical, thought processes intact, and able to concentrate and follow conversation  NIH Score:    C-SSRS: Risk of Suicide: No Risk  Bedside swallow:    Winger Coma Scale (GCS): Winger Coma Scale  Eye Opening: Spontaneous  Best Verbal Response: Oriented  Best Motor Response: Obeys commands  Winger Coma Scale Score: 15  Active LDA's:   Peripheral IV 08/08/24 Left Antecubital (Active)     PO Status: Nothing by Mouth  Pertinent or High Risk Medications/Drips: yes  oIf Yes, please provide details: dilt  Pending Blood Product Administration: no    You may also review the ED PT Care Timeline found under the Summary Nursing Index tab.    Recommendation    Pending orders see admission orders  Plan for Discharge (if known):  Baseline ambulation: Independent.  Voiding: Commode.  Belongings documented: no    Additional Comments: pt converted at 8am, now sinus trinidad. Hospitalist perfect served  If any further questions, please call Sending RN at [phone]    Electronically signed by: Electronically signed by Emiliano No RN on 8/8/2024 at 8:11 AM

## 2024-08-09 ENCOUNTER — APPOINTMENT (OUTPATIENT)
Age: 59
End: 2024-08-09
Payer: COMMERCIAL

## 2024-08-09 VITALS
BODY MASS INDEX: 45.99 KG/M2 | DIASTOLIC BLOOD PRESSURE: 73 MMHG | HEIGHT: 67 IN | WEIGHT: 293 LBS | RESPIRATION RATE: 18 BRPM | SYSTOLIC BLOOD PRESSURE: 119 MMHG | HEART RATE: 51 BPM | OXYGEN SATURATION: 97 % | TEMPERATURE: 98.4 F

## 2024-08-09 PROBLEM — I48.91 ATRIAL FIBRILLATION WITH RAPID VENTRICULAR RESPONSE (HCC): Status: ACTIVE | Noted: 2024-08-09

## 2024-08-09 LAB
ALBUMIN SERPL-MCNC: 3.4 G/DL (ref 3.4–5)
ALBUMIN/GLOB SERPL: 1.2 {RATIO} (ref 1.1–2.2)
ALP SERPL-CCNC: 121 U/L (ref 40–129)
ALT SERPL-CCNC: 10 U/L (ref 10–40)
ANION GAP SERPL CALCULATED.3IONS-SCNC: 7 MMOL/L (ref 3–16)
AST SERPL-CCNC: 15 U/L (ref 15–37)
BASOPHILS # BLD: 0.1 K/UL (ref 0–0.2)
BASOPHILS NFR BLD: 0.9 %
BILIRUB SERPL-MCNC: 0.3 MG/DL (ref 0–1)
BUN SERPL-MCNC: 14 MG/DL (ref 7–20)
CALCIUM SERPL-MCNC: 8.7 MG/DL (ref 8.3–10.6)
CHLORIDE SERPL-SCNC: 110 MMOL/L (ref 99–110)
CO2 SERPL-SCNC: 24 MMOL/L (ref 21–32)
CREAT SERPL-MCNC: 0.6 MG/DL (ref 0.6–1.1)
DEPRECATED RDW RBC AUTO: 14.2 % (ref 12.4–15.4)
ECHO AO ROOT DIAM: 3.4 CM
ECHO AO ROOT INDEX: 1.4 CM/M2
ECHO AV AREA PEAK VELOCITY: 2.5 CM2
ECHO AV AREA/BSA PEAK VELOCITY: 1 CM2/M2
ECHO AV CUSP MM: 2 CM
ECHO AV PEAK GRADIENT: 10 MMHG
ECHO AV PEAK GRADIENT: 10 MMHG
ECHO AV PEAK VELOCITY: 1.6 M/S
ECHO AV VELOCITY RATIO: 0.81
ECHO BSA: 2.56 M2
ECHO EST RA PRESSURE: 8 MMHG
ECHO LA AREA 2C: 22.3 CM2
ECHO LA AREA 4C: 22.4 CM2
ECHO LA DIAMETER INDEX: 1.57 CM/M2
ECHO LA DIAMETER: 3.8 CM
ECHO LA MAJOR AXIS: 6.4 CM
ECHO LA MINOR AXIS: 6.1 CM
ECHO LA TO AORTIC ROOT RATIO: 1.12
ECHO LA VOL BP: 67 ML (ref 22–52)
ECHO LA VOL MOD A2C: 68 ML (ref 22–52)
ECHO LA VOL MOD A4C: 63 ML (ref 22–52)
ECHO LA VOL/BSA BIPLANE: 28 ML/M2 (ref 16–34)
ECHO LA VOLUME INDEX MOD A4C: 26 ML/M2 (ref 16–34)
ECHO LV E' LATERAL VELOCITY: 12 CM/S
ECHO LV E' SEPTAL VELOCITY: 8 CM/S
ECHO LV EDV 3D: 175 ML
ECHO LV EDV INDEX 3D: 72 ML/M2
ECHO LV EJECTION FRACTION 3D: 65 %
ECHO LV ESV 3D: 61 ML
ECHO LV ESV INDEX 3D: 25 ML/M2
ECHO LV FRACTIONAL SHORTENING: 33 % (ref 28–44)
ECHO LV GLOBAL LONGITUDINAL STRAIN (GLS): -16.7 %
ECHO LV INTERNAL DIMENSION DIASTOLE INDEX: 2.27 CM/M2
ECHO LV INTERNAL DIMENSION DIASTOLIC: 5.5 CM (ref 3.9–5.3)
ECHO LV INTERNAL DIMENSION SYSTOLIC INDEX: 1.53 CM/M2
ECHO LV INTERNAL DIMENSION SYSTOLIC: 3.7 CM
ECHO LV ISOVOLUMETRIC RELAXATION TIME (IVRT): 99 MS
ECHO LV IVSD: 1.2 CM (ref 0.6–0.9)
ECHO LV MASS 2D: 227.4 G (ref 67–162)
ECHO LV MASS 3D INDEX: 78.1 G/M2
ECHO LV MASS 3D: 189 G
ECHO LV MASS INDEX 2D: 94 G/M2 (ref 43–95)
ECHO LV POSTERIOR WALL DIASTOLIC: 0.9 CM (ref 0.6–0.9)
ECHO LV RELATIVE WALL THICKNESS RATIO: 0.33
ECHO LVOT AREA: 3.1 CM2
ECHO LVOT DIAM: 2 CM
ECHO LVOT MEAN GRADIENT: 3 MMHG
ECHO LVOT PEAK GRADIENT: 7 MMHG
ECHO LVOT PEAK VELOCITY: 1.3 M/S
ECHO LVOT STROKE VOLUME INDEX: 38.9 ML/M2
ECHO LVOT SV: 94.2 ML
ECHO LVOT VTI: 30 CM
ECHO MV A VELOCITY: 0.59 M/S
ECHO MV E DECELERATION TIME (DT): 211 MS
ECHO MV E VELOCITY: 0.96 M/S
ECHO MV E/A RATIO: 1.63
ECHO MV E/E' LATERAL: 8
ECHO MV E/E' RATIO (AVERAGED): 10
ECHO RA AREA 4C: 20.4 CM2
ECHO RA END SYSTOLIC VOLUME APICAL 4 CHAMBER INDEX BSA: 23 ML/M2
ECHO RA VOLUME: 56 ML
ECHO RIGHT VENTRICULAR SYSTOLIC PRESSURE (RVSP): 26 MMHG
ECHO RV FREE WALL PEAK S': 14 CM/S
ECHO RV TAPSE: 2.8 CM (ref 1.7–?)
ECHO TV REGURGITANT MAX VELOCITY: 2.11 M/S
ECHO TV REGURGITANT PEAK GRADIENT: 18 MMHG
EOSINOPHIL # BLD: 0.2 K/UL (ref 0–0.6)
EOSINOPHIL NFR BLD: 2.4 %
GFR SERPLBLD CREATININE-BSD FMLA CKD-EPI: >90 ML/MIN/{1.73_M2}
GLUCOSE SERPL-MCNC: 87 MG/DL (ref 70–99)
HCT VFR BLD AUTO: 38 % (ref 36–48)
HGB BLD-MCNC: 12.3 G/DL (ref 12–16)
LYMPHOCYTES # BLD: 1.9 K/UL (ref 1–5.1)
LYMPHOCYTES NFR BLD: 28.6 %
MCH RBC QN AUTO: 29.7 PG (ref 26–34)
MCHC RBC AUTO-ENTMCNC: 32.3 G/DL (ref 31–36)
MCV RBC AUTO: 91.8 FL (ref 80–100)
MONOCYTES # BLD: 0.6 K/UL (ref 0–1.3)
MONOCYTES NFR BLD: 9.1 %
NEUTROPHILS # BLD: 3.9 K/UL (ref 1.7–7.7)
NEUTROPHILS NFR BLD: 59 %
PLATELET # BLD AUTO: 194 K/UL (ref 135–450)
PMV BLD AUTO: 11.5 FL (ref 5–10.5)
POTASSIUM SERPL-SCNC: 4.2 MMOL/L (ref 3.5–5.1)
PROT SERPL-MCNC: 6.2 G/DL (ref 6.4–8.2)
RBC # BLD AUTO: 4.14 M/UL (ref 4–5.2)
SODIUM SERPL-SCNC: 141 MMOL/L (ref 136–145)
WBC # BLD AUTO: 6.5 K/UL (ref 4–11)

## 2024-08-09 PROCEDURE — 6370000000 HC RX 637 (ALT 250 FOR IP): Performed by: INTERNAL MEDICINE

## 2024-08-09 PROCEDURE — 80053 COMPREHEN METABOLIC PANEL: CPT

## 2024-08-09 PROCEDURE — 85025 COMPLETE CBC W/AUTO DIFF WBC: CPT

## 2024-08-09 PROCEDURE — 36415 COLL VENOUS BLD VENIPUNCTURE: CPT

## 2024-08-09 PROCEDURE — G0378 HOSPITAL OBSERVATION PER HR: HCPCS

## 2024-08-09 PROCEDURE — 93306 TTE W/DOPPLER COMPLETE: CPT

## 2024-08-09 PROCEDURE — 2580000003 HC RX 258: Performed by: INTERNAL MEDICINE

## 2024-08-09 RX ADMIN — APIXABAN 5 MG: 5 TABLET, FILM COATED ORAL at 10:33

## 2024-08-09 RX ADMIN — SODIUM CHLORIDE, PRESERVATIVE FREE 10 ML: 5 INJECTION INTRAVENOUS at 10:33

## 2024-08-09 NOTE — PROGRESS NOTES
Patient discharged 8/9/2024, 6:37 PM per MD order. Patient's IV removed bleeding stopped, tele box removed place in dirty bin, VVS. Discharge instructions and medication education given all questions answered, no additional questions. Patient wheeled out by staff with personal belonging and family at side to personal vehicle.    
Physical/Occupational Therapy    PT/OT orders received, chart reviewed. Per discussion with RN, pt IND and functioning at baseline level. No skilled PT/OT needs at this time. Re-order if new needs arise.    Jenniffer Ramesh, PT, DPT  Jory Trotter, OTR/L  
160      Echo 6/2020  - Left ventricle: The cavity size is normal. Wall thickness is     moderately increased. The estimated ejection fraction was 53%.     Normal diastolic function. The global longitudinal strain was     -23%. Ejection fraction (EF) was calculated using 3D full volume     imaging method.   - Mitral valve: The annulus is mildly calcified.   - Left atrium: The atrium is mildly dilated.   - Inferior vena cava: The vessel was normal in size; the     respirophasic diameter changes were in the normal range (&gt;= 50%);     findings are consistent with normal central venous pressure.    All labs and testing reviewed.  Lab Review     Renal Profile:   Lab Results   Component Value Date/Time    CREATININE 0.6 08/09/2024 04:15 AM    BUN 14 08/09/2024 04:15 AM     08/09/2024 04:15 AM    K 4.2 08/09/2024 04:15 AM     08/09/2024 04:15 AM    CO2 24 08/09/2024 04:15 AM     CBC:    Lab Results   Component Value Date/Time    WBC 6.5 08/09/2024 04:15 AM    RBC 4.14 08/09/2024 04:15 AM    HGB 12.3 08/09/2024 04:15 AM    HCT 38.0 08/09/2024 04:15 AM    MCV 91.8 08/09/2024 04:15 AM    RDW 14.2 08/09/2024 04:15 AM     08/09/2024 04:15 AM     BNP:  No results found for: \"BNP\"  Fasting Lipid Panel:    Lab Results   Component Value Date/Time    CHOL 131 01/23/2017 06:06 AM    HDL 60 01/23/2017 06:06 AM    TRIG 49 01/23/2017 06:06 AM     Cardiac Enzymes:  CK/MbTroponin  Lab Results   Component Value Date/Time    TROPONINI <0.01 04/28/2020 12:37 AM     PT/ INR   Lab Results   Component Value Date/Time    INR 1.08 08/25/2016 12:24 AM    PROTIME 12.3 08/25/2016 12:24 AM     PTT No components found for: \"PTT\"   Lab Results   Component Value Date/Time    MG 2.00 07/10/2024 10:44 AM      Lab Results   Component Value Date/Time    TSH 1.32 07/10/2024 10:44 AM       Assessment:  Paroxysmal atrial fibrillation   -[id number] female   -Atrial fibrillation ablation 2017  Sick sinus syndrome  Obesity-BMI

## 2024-08-09 NOTE — DISCHARGE SUMMARY
Hospitalist Discharge Summary      Name:  oLrna Morales /Age/Sex: 1965 (59 y.o. female)   Admit Date: 2024  Discharge Date: 24    MRN & CSN:  [id number] & [id number] Encounter Date and Time 24 8:49 AM EDT    Attending:  Hyacinth De La Torre MD Discharging Provider: Hyacinth De La Torre MD       Disposition: home    Admission Diagnoses:   Patient Active Problem List   Diagnosis    PAF (paroxysmal atrial fibrillation) (HCC)    Essential hypertension    ABI on CPAP    Bradycardia    Fatigue    Obesity s/p gastric bypass    Atrial fibrillation with RVR (HCC)    Anxiety    A-fib (HCC)    Atrial fibrillation with rapid ventricular response (HCC)       Discharge Diagnoses: Principal Problem:    A-fib (HCC)  Active Problems:    PAF (paroxysmal atrial fibrillation) (HCC)    ABI on CPAP    Obesity s/p gastric bypass    Anxiety    Atrial fibrillation with rapid ventricular response (HCC)  Resolved Problems:    * No resolved hospital problems. *      Code Status:  Full Code    Condition:  Stable    Discharge Diet: Diet:  ADULT DIET; Regular; Low Fat/Low Chol/High Fiber/2 gm Na    PCP to do list:  follow up at [facility] for ablation - try to get earlier appointment    No future appointments.     Hospital Course: 59 y.o. female who presented to [facility] with  above c/o .  PMHx significant for  afib and HTN.  She was recently admitted with atrial fibrillation with rapid ventricular rate and sent home with no rate control medications as of bradycardia and advised to follow-up with her cardiology.  She developed above complaint last night and was brought to the emergency room.  He she was started on Cardizem infusion.  She was converted to sinus rhythm and currently she she has heart rate of 44.  Cardizem infusion was stopped.  Cardiology has stated to remain off of medications since she is planning to have ablation at [facility] and to try and get that done sooner, rather

## 2024-08-09 NOTE — PLAN OF CARE
Problem: Discharge Planning  Goal: Discharge to home or other facility with appropriate resources  8/9/2024 1833 by Betty Garces RN  Outcome: Adequate for Discharge  8/9/2024 1314 by Betty Garces RN  Outcome: Progressing     Problem: Safety - Adult  Goal: Free from fall injury  8/9/2024 1833 by Betty Garces, LIBBY  Outcome: Adequate for Discharge  8/9/2024 1314 by Betty Garces RN  Outcome: Progressing

## 2024-08-09 NOTE — PLAN OF CARE
Problem: Discharge Planning  Goal: Discharge to home or other facility with appropriate resources  8/9/2024 0218 by Cl Nieto, RN  Outcome: Progressing     Problem: Safety - Adult  Goal: Free from fall injury  8/9/2024 0218 by Cl Nieto, RN  Outcome: Progressing

## 2024-08-09 NOTE — CONSULTS
[address] [address]                              CONSULTATION      PATIENT NAME: STEW MOYER               : 1965  MED REC NO: [id number]                      ROOM: 0218  ACCOUNT NO: [id number]                       ADMIT DATE: 2024  PROVIDER: Randolph Haywood MD    CARDIAC ELECTROPHYSIOLOGY CONSULTATION    CONSULT DATE: 2024      REASON FOR CONSULTATION:  Atrial fibrillation.    HISTORY OF PRESENT ILLNESS:  The patient is a 59-year-old woman with history of significant sinus bradycardia and rare sustained episodes of paroxysmal atrial fibrillation, who is admitted for evaluation of an AF recurrence.  She has a long history of atrial fibrillation and underwent cryoablation in 2017, but has had recurrent atrial fibrillations since then.  Her episodes are generally rare, but sustained in duration lasting several hours.  She reports being awakened from sleep at about 3:00 a.m. on 2024 with rapid tachy palpitations.  She presented to the emergency department, where the initial ECG demonstrates atrial fibrillation and average ventricular rate of 160 beats per minute.  She has subsequent spontaneous termination back to sinus rhythm at about 8 a.m.  She was admitted for a similar episode about 1 month ago.  Pharmacologic therapy was discussed at that time, but based on her significant and persistent sinus bradycardia, pharmacologic therapy was not initiated.  Catheter ablation is being planned at [facility].  Laboratory evaluation does not demonstrate significant acid-base or electrolyte abnormalities, which might be expected to exacerbate the rhythm disturbance.  She does have elevated alkaline phosphatase at 133.  This was elevated to 148 in 2020, but then had declined to 117 earlier this year.  Her last echocardiogram was in .  Review of ECG telemetry monitoring

## 2024-08-09 NOTE — PLAN OF CARE
Problem: Discharge Planning  Goal: Discharge to home or other facility with appropriate resources  8/9/2024 1314 by Betty Garces RN  Outcome: Progressing  8/9/2024 0218 by Cl Nieto RN  Outcome: Progressing     Problem: Safety - Adult  Goal: Free from fall injury  8/9/2024 1314 by Betty Garces RN  Outcome: Progressing  8/9/2024 0218 by Cl Nieto RN  Outcome: Progressing

## 2024-11-29 ENCOUNTER — HOSPITAL ENCOUNTER (INPATIENT)
Age: 59
LOS: 2 days | Discharge: HOME | DRG: 309 | End: 2024-12-01
Attending: EMERGENCY MEDICINE
Payer: COMMERCIAL

## 2024-11-29 ENCOUNTER — APPOINTMENT (OUTPATIENT)
Dept: GENERAL RADIOLOGY | Age: 59
DRG: 309 | End: 2024-11-29
Payer: COMMERCIAL

## 2024-11-29 DIAGNOSIS — I48.91 ATRIAL FIBRILLATION WITH RAPID VENTRICULAR RESPONSE (HCC): Primary | ICD-10-CM

## 2024-11-29 DIAGNOSIS — R00.2 PALPITATIONS: ICD-10-CM

## 2024-11-29 LAB
ALBUMIN SERPL-MCNC: 3.8 G/DL (ref 3.4–5)
ALBUMIN/GLOB SERPL: 1.2 {RATIO} (ref 1.1–2.2)
ALP SERPL-CCNC: 140 U/L (ref 40–129)
ALT SERPL-CCNC: 15 U/L (ref 10–40)
ANION GAP SERPL CALCULATED.3IONS-SCNC: 14 MMOL/L (ref 3–16)
APTT BLD: 30.4 SEC (ref 22.1–36.4)
AST SERPL-CCNC: 21 U/L (ref 15–37)
BASOPHILS # BLD: 0 K/UL (ref 0–0.2)
BASOPHILS NFR BLD: 0.4 %
BILIRUB SERPL-MCNC: 0.4 MG/DL (ref 0–1)
BUN SERPL-MCNC: 18 MG/DL (ref 7–20)
CALCIUM SERPL-MCNC: 8.8 MG/DL (ref 8.3–10.6)
CHLORIDE SERPL-SCNC: 104 MMOL/L (ref 99–110)
CO2 SERPL-SCNC: 21 MMOL/L (ref 21–32)
CREAT SERPL-MCNC: 0.6 MG/DL (ref 0.6–1.1)
D-DIMER QUANTITATIVE: <0.27 UG/ML FEU (ref 0–0.6)
DEPRECATED RDW RBC AUTO: 13.9 % (ref 12.4–15.4)
EOSINOPHIL # BLD: 0.1 K/UL (ref 0–0.6)
EOSINOPHIL NFR BLD: 1.4 %
GFR SERPLBLD CREATININE-BSD FMLA CKD-EPI: >90 ML/MIN/{1.73_M2}
GLUCOSE SERPL-MCNC: 98 MG/DL (ref 70–99)
HCT VFR BLD AUTO: 39.9 % (ref 36–48)
HGB BLD-MCNC: 13.2 G/DL (ref 12–16)
INR PPP: 1.11 (ref 0.85–1.15)
LYMPHOCYTES # BLD: 2 K/UL (ref 1–5.1)
LYMPHOCYTES NFR BLD: 26.4 %
MCH RBC QN AUTO: 30.1 PG (ref 26–34)
MCHC RBC AUTO-ENTMCNC: 33.2 G/DL (ref 31–36)
MCV RBC AUTO: 90.6 FL (ref 80–100)
MONOCYTES # BLD: 0.6 K/UL (ref 0–1.3)
MONOCYTES NFR BLD: 7.3 %
NEUTROPHILS # BLD: 5 K/UL (ref 1.7–7.7)
NEUTROPHILS NFR BLD: 64.5 %
NT-PROBNP SERPL-MCNC: 180 PG/ML (ref 0–124)
PLATELET # BLD AUTO: 221 K/UL (ref 135–450)
PMV BLD AUTO: 10.3 FL (ref 5–10.5)
POTASSIUM SERPL-SCNC: 3.8 MMOL/L (ref 3.5–5.1)
PROT SERPL-MCNC: 6.9 G/DL (ref 6.4–8.2)
PROTHROMBIN TIME: 14.5 SEC (ref 11.9–14.9)
RBC # BLD AUTO: 4.4 M/UL (ref 4–5.2)
SODIUM SERPL-SCNC: 139 MMOL/L (ref 136–145)
TROPONIN, HIGH SENSITIVITY: 10 NG/L (ref 0–14)
TROPONIN, HIGH SENSITIVITY: 7 NG/L (ref 0–14)
WBC # BLD AUTO: 7.7 K/UL (ref 4–11)

## 2024-11-29 PROCEDURE — 6370000000 HC RX 637 (ALT 250 FOR IP)

## 2024-11-29 PROCEDURE — 2500000003 HC RX 250 WO HCPCS: Performed by: EMERGENCY MEDICINE

## 2024-11-29 PROCEDURE — 85730 THROMBOPLASTIN TIME PARTIAL: CPT

## 2024-11-29 PROCEDURE — 96376 TX/PRO/DX INJ SAME DRUG ADON: CPT

## 2024-11-29 PROCEDURE — 85025 COMPLETE CBC W/AUTO DIFF WBC: CPT

## 2024-11-29 PROCEDURE — 2580000003 HC RX 258

## 2024-11-29 PROCEDURE — 2060000000 HC ICU INTERMEDIATE R&B

## 2024-11-29 PROCEDURE — 71045 X-RAY EXAM CHEST 1 VIEW: CPT

## 2024-11-29 PROCEDURE — 6360000002 HC RX W HCPCS

## 2024-11-29 PROCEDURE — 96374 THER/PROPH/DIAG INJ IV PUSH: CPT

## 2024-11-29 PROCEDURE — 36415 COLL VENOUS BLD VENIPUNCTURE: CPT

## 2024-11-29 PROCEDURE — 93005 ELECTROCARDIOGRAM TRACING: CPT | Performed by: EMERGENCY MEDICINE

## 2024-11-29 PROCEDURE — 84484 ASSAY OF TROPONIN QUANT: CPT

## 2024-11-29 PROCEDURE — 80053 COMPREHEN METABOLIC PANEL: CPT

## 2024-11-29 PROCEDURE — 83880 ASSAY OF NATRIURETIC PEPTIDE: CPT

## 2024-11-29 PROCEDURE — 2580000003 HC RX 258: Performed by: EMERGENCY MEDICINE

## 2024-11-29 PROCEDURE — 85379 FIBRIN DEGRADATION QUANT: CPT

## 2024-11-29 PROCEDURE — 6370000000 HC RX 637 (ALT 250 FOR IP): Performed by: EMERGENCY MEDICINE

## 2024-11-29 PROCEDURE — 85610 PROTHROMBIN TIME: CPT

## 2024-11-29 PROCEDURE — 99285 EMERGENCY DEPT VISIT HI MDM: CPT

## 2024-11-29 RX ORDER — SODIUM CHLORIDE 0.9 % (FLUSH) 0.9 %
5-40 SYRINGE (ML) INJECTION EVERY 12 HOURS SCHEDULED
Status: DISCONTINUED | OUTPATIENT
Start: 2024-11-29 | End: 2024-12-01 | Stop reason: HOSPADM

## 2024-11-29 RX ORDER — HYDROXYZINE PAMOATE 25 MG/1
25 CAPSULE ORAL ONCE
Status: COMPLETED | OUTPATIENT
Start: 2024-11-29 | End: 2024-11-29

## 2024-11-29 RX ORDER — POLYETHYLENE GLYCOL 3350 17 G/17G
17 POWDER, FOR SOLUTION ORAL DAILY PRN
Status: DISCONTINUED | OUTPATIENT
Start: 2024-11-29 | End: 2024-12-01 | Stop reason: HOSPADM

## 2024-11-29 RX ORDER — ACETAMINOPHEN 325 MG/1
650 TABLET ORAL EVERY 6 HOURS PRN
Status: DISCONTINUED | OUTPATIENT
Start: 2024-11-29 | End: 2024-12-01 | Stop reason: HOSPADM

## 2024-11-29 RX ORDER — ONDANSETRON 2 MG/ML
4 INJECTION INTRAMUSCULAR; INTRAVENOUS EVERY 6 HOURS PRN
Status: DISCONTINUED | OUTPATIENT
Start: 2024-11-29 | End: 2024-12-01 | Stop reason: HOSPADM

## 2024-11-29 RX ORDER — DILTIAZEM HYDROCHLORIDE 5 MG/ML
5 INJECTION INTRAVENOUS ONCE
Status: COMPLETED | OUTPATIENT
Start: 2024-11-29 | End: 2024-11-29

## 2024-11-29 RX ORDER — DILTIAZEM HYDROCHLORIDE 5 MG/ML
10 INJECTION INTRAVENOUS ONCE
Status: COMPLETED | OUTPATIENT
Start: 2024-11-29 | End: 2024-11-29

## 2024-11-29 RX ORDER — ACETAMINOPHEN 650 MG/1
650 SUPPOSITORY RECTAL EVERY 6 HOURS PRN
Status: DISCONTINUED | OUTPATIENT
Start: 2024-11-29 | End: 2024-12-01 | Stop reason: HOSPADM

## 2024-11-29 RX ORDER — LORAZEPAM 2 MG/ML
0.5 INJECTION INTRAMUSCULAR ONCE
Status: COMPLETED | OUTPATIENT
Start: 2024-11-29 | End: 2024-11-29

## 2024-11-29 RX ORDER — SODIUM CHLORIDE 0.9 % (FLUSH) 0.9 %
5-40 SYRINGE (ML) INJECTION PRN
Status: DISCONTINUED | OUTPATIENT
Start: 2024-11-29 | End: 2024-12-01 | Stop reason: HOSPADM

## 2024-11-29 RX ORDER — FUROSEMIDE 40 MG/1
40 TABLET ORAL DAILY PRN
COMMUNITY
Start: 2024-08-16

## 2024-11-29 RX ORDER — SODIUM CHLORIDE 9 MG/ML
INJECTION, SOLUTION INTRAVENOUS PRN
Status: DISCONTINUED | OUTPATIENT
Start: 2024-11-29 | End: 2024-12-01 | Stop reason: HOSPADM

## 2024-11-29 RX ORDER — ONDANSETRON 4 MG/1
4 TABLET, ORALLY DISINTEGRATING ORAL EVERY 8 HOURS PRN
Status: DISCONTINUED | OUTPATIENT
Start: 2024-11-29 | End: 2024-12-01 | Stop reason: HOSPADM

## 2024-11-29 RX ADMIN — SODIUM CHLORIDE 5 MG/HR: 900 INJECTION, SOLUTION INTRAVENOUS at 18:49

## 2024-11-29 RX ADMIN — DILTIAZEM HYDROCHLORIDE 5 MG: 5 INJECTION INTRAVENOUS at 18:32

## 2024-11-29 RX ADMIN — LORAZEPAM 0.5 MG: 2 INJECTION INTRAMUSCULAR; INTRAVENOUS at 20:03

## 2024-11-29 RX ADMIN — DILTIAZEM HYDROCHLORIDE 10 MG: 5 INJECTION INTRAVENOUS at 18:07

## 2024-11-29 RX ADMIN — HYDROXYZINE PAMOATE 25 MG: 25 CAPSULE ORAL at 18:32

## 2024-11-29 RX ADMIN — SODIUM CHLORIDE, PRESERVATIVE FREE 5 ML: 5 INJECTION INTRAVENOUS at 21:36

## 2024-11-29 RX ADMIN — APIXABAN 5 MG: 5 TABLET, FILM COATED ORAL at 21:22

## 2024-11-29 ASSESSMENT — PAIN - FUNCTIONAL ASSESSMENT: PAIN_FUNCTIONAL_ASSESSMENT: 0-10

## 2024-11-29 ASSESSMENT — PAIN SCALES - GENERAL: PAINLEVEL_OUTOF10: 7

## 2024-11-29 NOTE — ED PROVIDER NOTES
Eyes:      Extraocular Movements: Extraocular movements intact.      Pupils: Pupils are equal, round, and reactive to light.   Cardiovascular:      Rate and Rhythm: Tachycardia present. Rhythm irregular.      Heart sounds: Normal heart sounds.   Pulmonary:      Effort: Pulmonary effort is normal. No respiratory distress.      Breath sounds: Normal breath sounds. No stridor. No wheezing, rhonchi or rales.   Chest:      Chest wall: No tenderness.   Abdominal:      General: Abdomen is flat. Bowel sounds are normal.      Palpations: Abdomen is soft.      Tenderness: There is no abdominal tenderness.   Musculoskeletal:      Cervical back: Neck supple.      Right lower leg: No edema.      Left lower leg: No edema.   Skin:     General: Skin is warm and dry.      Capillary Refill: Capillary refill takes less than 2 seconds.   Neurological:      General: No focal deficit present.      Mental Status: She is alert and oriented to person, place, and time.   Psychiatric:         Mood and Affect: Mood normal.         Behavior: Behavior normal.         DIAGNOSTIC RESULTS   LABS:    Labs Reviewed   COMPREHENSIVE METABOLIC PANEL W/ REFLEX TO MG FOR LOW K - Abnormal; Notable for the following components:       Result Value    Alkaline Phosphatase 140 (*)     All other components within normal limits   BRAIN NATRIURETIC PEPTIDE - Abnormal; Notable for the following components:    NT Pro- (*)     All other components within normal limits   CBC WITH AUTO DIFFERENTIAL   TROPONIN   PROTIME-INR   APTT   D-DIMER, QUANTITATIVE   TROPONIN       When ordered only abnormal lab results are displayed. All other labs were within normal range or not returned as of this dictation.    EKG:     EKG Interpretation    Interpreted by emergency department physician    Rhythm: atrial fibrillation - rapid  Rate: >160  Axis: normal  Ectopy: none  Conduction: normal  ST Segments: nonspecific changes  T Waves: non specific changes  Q Waves:

## 2024-11-30 LAB
ANION GAP SERPL CALCULATED.3IONS-SCNC: 8 MMOL/L (ref 3–16)
BASOPHILS # BLD: 0 K/UL (ref 0–0.2)
BASOPHILS NFR BLD: 0.8 %
BUN SERPL-MCNC: 15 MG/DL (ref 7–20)
CALCIUM SERPL-MCNC: 9.5 MG/DL (ref 8.3–10.6)
CHLORIDE SERPL-SCNC: 109 MMOL/L (ref 99–110)
CO2 SERPL-SCNC: 24 MMOL/L (ref 21–32)
CREAT SERPL-MCNC: 0.6 MG/DL (ref 0.6–1.1)
DEPRECATED RDW RBC AUTO: 14.3 % (ref 12.4–15.4)
EKG DIAGNOSIS: NORMAL
EKG DIAGNOSIS: NORMAL
EKG Q-T INTERVAL: 270 MS
EKG Q-T INTERVAL: 324 MS
EKG QRS DURATION: 82 MS
EKG QRS DURATION: 84 MS
EKG QTC CALCULATION (BAZETT): 440 MS
EKG QTC CALCULATION (BAZETT): 442 MS
EKG R AXIS: 17 DEGREES
EKG R AXIS: 7 DEGREES
EKG T AXIS: -31 DEGREES
EKG T AXIS: 14 DEGREES
EKG VENTRICULAR RATE: 112 BPM
EKG VENTRICULAR RATE: 160 BPM
EOSINOPHIL # BLD: 0.1 K/UL (ref 0–0.6)
EOSINOPHIL NFR BLD: 1.3 %
GFR SERPLBLD CREATININE-BSD FMLA CKD-EPI: >90 ML/MIN/{1.73_M2}
GLUCOSE SERPL-MCNC: 94 MG/DL (ref 70–99)
HCT VFR BLD AUTO: 36.9 % (ref 36–48)
HGB BLD-MCNC: 12.2 G/DL (ref 12–16)
LYMPHOCYTES # BLD: 1.4 K/UL (ref 1–5.1)
LYMPHOCYTES NFR BLD: 24.5 %
MCH RBC QN AUTO: 29.6 PG (ref 26–34)
MCHC RBC AUTO-ENTMCNC: 33 G/DL (ref 31–36)
MCV RBC AUTO: 89.9 FL (ref 80–100)
MONOCYTES # BLD: 0.5 K/UL (ref 0–1.3)
MONOCYTES NFR BLD: 8.5 %
NEUTROPHILS # BLD: 3.7 K/UL (ref 1.7–7.7)
NEUTROPHILS NFR BLD: 64.9 %
PLATELET # BLD AUTO: 209 K/UL (ref 135–450)
PMV BLD AUTO: 11.1 FL (ref 5–10.5)
POTASSIUM SERPL-SCNC: 4.7 MMOL/L (ref 3.5–5.1)
RBC # BLD AUTO: 4.11 M/UL (ref 4–5.2)
SODIUM SERPL-SCNC: 141 MMOL/L (ref 136–145)
WBC # BLD AUTO: 5.7 K/UL (ref 4–11)

## 2024-11-30 PROCEDURE — 84443 ASSAY THYROID STIM HORMONE: CPT

## 2024-11-30 PROCEDURE — 93005 ELECTROCARDIOGRAM TRACING: CPT | Performed by: STUDENT IN AN ORGANIZED HEALTH CARE EDUCATION/TRAINING PROGRAM

## 2024-11-30 PROCEDURE — 6370000000 HC RX 637 (ALT 250 FOR IP): Performed by: STUDENT IN AN ORGANIZED HEALTH CARE EDUCATION/TRAINING PROGRAM

## 2024-11-30 PROCEDURE — 6370000000 HC RX 637 (ALT 250 FOR IP)

## 2024-11-30 PROCEDURE — 85025 COMPLETE CBC W/AUTO DIFF WBC: CPT

## 2024-11-30 PROCEDURE — 2060000000 HC ICU INTERMEDIATE R&B

## 2024-11-30 PROCEDURE — 93010 ELECTROCARDIOGRAM REPORT: CPT | Performed by: INTERNAL MEDICINE

## 2024-11-30 PROCEDURE — 36415 COLL VENOUS BLD VENIPUNCTURE: CPT

## 2024-11-30 PROCEDURE — 80048 BASIC METABOLIC PNL TOTAL CA: CPT

## 2024-11-30 PROCEDURE — 99222 1ST HOSP IP/OBS MODERATE 55: CPT | Performed by: STUDENT IN AN ORGANIZED HEALTH CARE EDUCATION/TRAINING PROGRAM

## 2024-11-30 PROCEDURE — 2580000003 HC RX 258

## 2024-11-30 RX ORDER — LORAZEPAM 0.5 MG/1
0.5 TABLET ORAL 2 TIMES DAILY PRN
Status: DISCONTINUED | OUTPATIENT
Start: 2024-11-30 | End: 2024-12-01 | Stop reason: HOSPADM

## 2024-11-30 RX ADMIN — APIXABAN 5 MG: 5 TABLET, FILM COATED ORAL at 08:47

## 2024-11-30 RX ADMIN — APIXABAN 5 MG: 5 TABLET, FILM COATED ORAL at 21:11

## 2024-11-30 RX ADMIN — LORAZEPAM 0.5 MG: 0.5 TABLET ORAL at 21:11

## 2024-11-30 RX ADMIN — SODIUM CHLORIDE, PRESERVATIVE FREE 5 ML: 5 INJECTION INTRAVENOUS at 20:17

## 2024-11-30 RX ADMIN — SODIUM CHLORIDE, PRESERVATIVE FREE 10 ML: 5 INJECTION INTRAVENOUS at 08:54

## 2024-11-30 NOTE — CONSULTS
Cardiology consultation completed see note from this morning.  
office visit with the EP on 11/14/2024 her weight was 298 pounds.      Past Medical History:   has a past medical history of Atrial fib/flutter, transient and Hypertension.    Surgical History:   has a past surgical history that includes Gastric bypass surgery; Cholecystectomy; and AV node ablation.     Social History:   reports that she has never smoked. She has never used smokeless tobacco. She reports that she does not drink alcohol and does not use drugs.     Family History:  family history is not on file.      Home Medications:  Were reviewed and are listed in nursing record and/or below  Prior to Admission medications    Medication Sig Start Date End Date Taking? Authorizing Provider   furosemide (LASIX) 40 MG tablet Take 1 tablet by mouth daily as needed 8/16/24  Yes Jane Paulino MD   apixaban (ELIQUIS) 5 MG TABS tablet Take 1 tablet by mouth 2 times daily 3/1/24  Yes Jane Paulino MD   Tirzepatide-Weight Management 5 MG/0.5ML SOAJ Inject 5 mg into the skin every 7 days Fridays 6/3/24   Jane Paulino MD   Cholecalciferol (VITAMIN D3) 1.25 MG (65458 UT) CAPS Take 5,000 Units by mouth once a week Fridays  Patient not taking: Reported on 11/29/2024 4/18/24   Jane Paulino MD        CURRENT Medications:  dilTIAZem 100 mg in sodium chloride 0.9 % 100 mL infusion ([facility]), Continuous  apixaban (ELIQUIS) tablet 5 mg, BID  sodium chloride flush 0.9 % injection 5-40 mL, 2 times per day  sodium chloride flush 0.9 % injection 5-40 mL, PRN  0.9 % sodium chloride infusion, PRN  ondansetron (ZOFRAN-ODT) disintegrating tablet 4 mg, Q8H PRN   Or  ondansetron (ZOFRAN) injection 4 mg, Q6H PRN  polyethylene glycol (GLYCOLAX) packet 17 g, Daily PRN  acetaminophen (TYLENOL) tablet 650 mg, Q6H PRN   Or  acetaminophen (TYLENOL) suppository 650 mg, Q6H PRN        Allergies:  Morphine, Floxin [ofloxacin], Codeine, and Penicillins     Review of Systems:   A 14 point review of symptoms completed.

## 2024-11-30 NOTE — ED NOTES
11/29/24 7:46 PM   [phone] Hospital or Facility: [facility] From: Nicole Sigala RE: STEW MOYER 1965 RM: 10-10  admitted pt. Pt requesting something for anxiety. Tried hydroxizine w/o relief. Need Callback: NO CALLBACK REQ ED ROUTINE  Unread   
Tele [phone]  
normal limits   BRAIN NATRIURETIC PEPTIDE - Abnormal; Notable for the following components:    NT Pro- (*)     All other components within normal limits     Critical values: no  Intervention for critical value(s):     Abnormal Imaging: no             You may also review the ED PT Care Timeline found under the Summary Tab, ED Encounter Summary, Timeline Reports, ED Patient Care Timeline.     Recommendation    Pending orders/Uncompleted orders to hand off:      Additional Comments:   If any further questions, please call Sending RN at [phone]

## 2024-11-30 NOTE — H&P
Hospital Medicine History & Physical      Date of Admission: 11/29/2024    Date of Service:  Pt seen/examined on 11/29/24     [x]Admitted to Inpatient with expected LOS greater than two midnights due to medical therapy.  []Placed in Observation status.    Chief Admission Complaint:  Palpitations, Atrial Fibrillation     Presenting Admission History:      Patient is a 59 year old female with a past medical history of atrial fibrillation who presented today for atrial fibrillation. She states that she has not felt well all week. She states that she was up 7 pounds and took a dose of Lasix on Wednesday. She states that she knew she was in atrial fibrillation. She states that she has had multiple cardioversions and ablations with no improvement. She is not on any rate or rhythm control due to bradycardia. Upon arrival to the ED she was found to be in atrial fibrillation with RVR. She received diltiazem bolus with improvement in her symptoms.     Assessment/Plan:      Current Principal Problem:  Atrial fibrillation with RVR (HCC)    Atrial Fibrillation with RVR  - S/p multiple cardioversions and ablations   - Last ablation in 9/2024 at [facility]   - Continue Eliquis   - Continue Diltiazem gtt  - Monitor on tele   - EP was consulted and appreciate their recommendations in advance     ABI  - Order placed for home CPAP     Obesity - With Body mass index is 45.89 kg/m².       [] Class I - 30.0 to 34.9 kg/m2  [] Class II - 35.0 to 39.9 kg/m2  [x] Class III - >=40 kg/m2 (AKA severe/morbid/massive)   [] Super Obesity - > 50 kg/m2  [] Super Super Obesity - > 60 kg/m2    Complicating assessment and treatment. Placing patient at risk for multiple co-morbidities as well as early death and contributing to the patient's presentation.        Discussed management and the need for Hospitalization of the patient w/ the Emergency Department Provider: Julien Iyer MD     CXR: I have reviewed the CXR with the following interpretation: No

## 2024-12-01 VITALS
HEIGHT: 67 IN | BODY MASS INDEX: 45.78 KG/M2 | WEIGHT: 291.7 LBS | DIASTOLIC BLOOD PRESSURE: 76 MMHG | RESPIRATION RATE: 16 BRPM | SYSTOLIC BLOOD PRESSURE: 121 MMHG | TEMPERATURE: 98 F | OXYGEN SATURATION: 97 % | HEART RATE: 52 BPM

## 2024-12-01 LAB
EKG ATRIAL RATE: 50 BPM
EKG DIAGNOSIS: NORMAL
EKG P AXIS: 49 DEGREES
EKG P-R INTERVAL: 166 MS
EKG Q-T INTERVAL: 446 MS
EKG QRS DURATION: 84 MS
EKG QTC CALCULATION (BAZETT): 406 MS
EKG R AXIS: 18 DEGREES
EKG T AXIS: 29 DEGREES
EKG VENTRICULAR RATE: 50 BPM
TSH SERPL DL<=0.005 MIU/L-ACNC: 0.66 UIU/ML (ref 0.27–4.2)

## 2024-12-01 PROCEDURE — 6370000000 HC RX 637 (ALT 250 FOR IP)

## 2024-12-01 PROCEDURE — 93010 ELECTROCARDIOGRAM REPORT: CPT | Performed by: INTERNAL MEDICINE

## 2024-12-01 PROCEDURE — 99232 SBSQ HOSP IP/OBS MODERATE 35: CPT | Performed by: NURSE PRACTITIONER

## 2024-12-01 PROCEDURE — 2580000003 HC RX 258

## 2024-12-01 PROCEDURE — 6370000000 HC RX 637 (ALT 250 FOR IP): Performed by: STUDENT IN AN ORGANIZED HEALTH CARE EDUCATION/TRAINING PROGRAM

## 2024-12-01 RX ADMIN — APIXABAN 5 MG: 5 TABLET, FILM COATED ORAL at 08:52

## 2024-12-01 RX ADMIN — LORAZEPAM 0.5 MG: 0.5 TABLET ORAL at 11:32

## 2024-12-01 RX ADMIN — SODIUM CHLORIDE, PRESERVATIVE FREE 10 ML: 5 INJECTION INTRAVENOUS at 08:52

## 2024-12-01 NOTE — PLAN OF CARE
Problem: Discharge Planning  Goal: Discharge to home or other facility with appropriate resources  12/1/2024 1155 by Lilliana Smith RN  Outcome: Adequate for Discharge  12/1/2024 0228 by Danielle Brar RN  Outcome: Progressing     Problem: Pain  Goal: Verbalizes/displays adequate comfort level or baseline comfort level  12/1/2024 1155 by Lilliana Smith RN  Outcome: Adequate for Discharge  12/1/2024 0228 by Danielle Brar RN  Outcome: Progressing     Problem: ABCDS Injury Assessment  Goal: Absence of physical injury  12/1/2024 1155 by Lilliana Smith RN  Outcome: Adequate for Discharge  12/1/2024 0228 by Danielle Brar RN  Outcome: Progressing     Problem: Safety - Adult  Goal: Free from fall injury  12/1/2024 1155 by Lilliana Smith RN  Outcome: Adequate for Discharge  12/1/2024 0228 by Danielle Brar RN  Outcome: Progressing

## 2024-12-01 NOTE — DISCHARGE INSTRUCTIONS
Your information:  Name: Lorna Morales  : 1965    Your instructions: Take all your medications as prescribed call Monday am to schedule follow up with your cardiologist    What to do after you leave the hospital:    Recommended diet: regular diet    Recommended activity: activity as tolerated        The following personal items were collected during your admission and were returned to you:    Belongings  Dental Appliances: None  Vision - Corrective Lenses: Eyeglasses  Hearing Aid: None  Clothing: Pants, Shirt, Socks, Undergarments  Jewelry: Bracelet, Ring, Sent home  Electronic Devices: Cell Phone,   Weapons (Notify Protective Services/Security): None  Home Medications: None  Valuables Given To: Family (Comment)  Provide Name(s) of Who Valuable(s) Were Given To: spouse    Information obtained by:  By signing below, I understand that if any problems occur once I leave the hospital I am to contact your cardiologist.  I understand and acknowledge receipt of the instructions indicated above.

## 2024-12-01 NOTE — DISCHARGE SUMMARY
Hospital Medicine Discharge Summary    Patient: Lorna Morales   : 1965     Admit Date: 2024   Discharge Date:   24  Disposition:  [x]Home   []HHC  []SNF  []Acute Rehab  []LTAC  []Hospice  Code status:  [x]Full  []DNR/CCA  []Limited (DNR/CCA with Do Not Intubate)  []DNRCC  Condition at Discharge: Stable  Primary Care Provider: Ras Wong MD    Admitting Provider: Lorna Del Toro DO  Discharge Provider: James Falk DO     Discharge Diagnoses:      Active Hospital Problems    Diagnosis     Atrial fibrillation with RVR ([facility]) [I48.91]        Presenting Admission History:      Patient is a 59 year old female with a past medical history of atrial fibrillation who presented today for atrial fibrillation. She states that she has not felt well all week. She states that she was up 7 pounds and took a dose of Lasix on Wednesday. She states that she knew she was in atrial fibrillation. She states that she has had multiple cardioversions and ablations with no improvement. She is not on any rate or rhythm control due to bradycardia. Upon arrival to the ED she was found to be in atrial fibrillation with RVR. She received diltiazem bolus with improvement in her symptoms.          Assessment/Plan:      Atrial Fibrillation - chronic paroxysmal, of unspecified and clinically unable to determine etiology.  Anticoagulated at baseline on home eliquis - continued.  -Cardiology consulted, note reviewed on 2024 with recommendations to ask EP to see on Monday, continue Eliquis, avoid AV elana agents, okay for diet, recommend CPAP for patient  -Currently off diltiazem drip  -Spoke with cardiology and they are okay with patient discharging with close follow-up with EP provider     ABI - likely 2nd to obesity.  Controlled on home CPAP and/or BiPap - continued, w/ outpatient follow-up as previously arranged.       Obesity - With Body mass index is 46.44 kg/m².       [] Class I - 30.0 to 34.9 kg/m2  []

## 2024-12-01 NOTE — PROGRESS NOTES
[facility] Medicine Progress Note  V 10.25      Date of Admission: 11/29/2024    Hospital Day: 2      Chief Admission Complaint:  palpitations    Subjective: Patient is in hospital bed awake and alert.  No new issues or complaints today.  Patient states he feels \"good \".  Denies any pain or shortness of breath at this time.  Had palpitations in the a.m.    Presenting Admission History:       Patient is a 59 year old female with a past medical history of atrial fibrillation who presented today for atrial fibrillation. She states that she has not felt well all week. She states that she was up 7 pounds and took a dose of Lasix on Wednesday. She states that she knew she was in atrial fibrillation. She states that she has had multiple cardioversions and ablations with no improvement. She is not on any rate or rhythm control due to bradycardia. Upon arrival to the ED she was found to be in atrial fibrillation with RVR. She received diltiazem bolus with improvement in her symptoms.         Assessment/Plan:      Current Principal Problem:  Atrial fibrillation with RVR (HCC)    Atrial Fibrillation - chronic paroxysmal, of unspecified and clinically unable to determine etiology.  Anticoagulated at baseline on home eliquis - continued.  -Cardiology consulted, note reviewed on 11/30/2024 with recommendations to ask EP to see on Monday, continue Eliquis, avoid AV elana agents, okay for diet, recommend CPAP for patient  -Currently off diltiazem drip    ABI - likely 2nd to obesity.  Controlled on home CPAP and/or BiPap - continued, w/ outpatient follow-up as previously arranged.      Obesity - With Body mass index is 46.44 kg/m².       [] Class I - 30.0 to 34.9 kg/m2  [] Class II - 35.0 to 39.9 kg/m2  [x] Class III - >=40 kg/m2 (AKA severe/morbid/massive)   [] Super Obesity - > 50 kg/m2  [] Super Super Obesity - > 60 kg/m2    Complicating assessment and treatment. Placing patient at risk for multiple co-morbidities as well as 
4 Eyes Skin Assessment     NAME:  Lorna Morales  YOB: 1965  MEDICAL RECORD NUMBER:  [id number]    The patient is being assessed for  Admission    I agree that at least one RN has performed a thorough Head to Toe Skin Assessment on the patient. ALL assessment sites listed below have been assessed.      Areas assessed by both nurses:    Head, Face, Ears, Shoulders, Back, Chest, Arms, Elbows, Hands, Sacrum. Buttock, Coccyx, Ischium, and Legs. Feet and Heels        Does the Patient have a Wound? No noted wound(s)       Anthony Prevention initiated by RN: Yes  Wound Care Orders initiated by RN: No    Pressure Injury (Stage 3,4, Unstageable, DTI, NWPT, and Complex wounds) if present, place Wound referral order by RN under : No    New Ostomies, if present place, Ostomy referral order under : No     Nurse 1 eSignature: Electronically signed by Danielle Brar RN on 11/29/24 at 10:19 PM EST    **SHARE this note so that the co-signing nurse can place an eSignature**    Nurse 2 eSignature: {Esignature:[id number]}   
Diltiazem gtt stopped at 2110 due to HR 49. Covering provider notified. Other VSS.   
Notifed by CMU, patient coverted to sinus trinidad from AFIB RVR. HR 55 on telemetry.   
Patient admitted to room 449 from ED.  Patient oriented to room, call light, bed rails, phone, lights and bathroom.  Patient instructed about the schedule of the day including: vital sign frequency, lab draws, possible tests, frequency of MD and staff rounds, including RN/MD rounding together at bedside, daily weights, and I &O's.  Patient instructed about prescribed diet, how to use 8MENU, and television.  Telemetry box 25 in place, patient aware of placement and reason.  Suction set up in room.  Bed locked, in lowest position, side rails up 2/4, call light within reach.  Will continue to monitor.     
Pt ambulated the unit hallway once around with no complaints. Heart rate remains stable and in NSR.   
To family car per wheelchair belongings with patient.  
Resting in bed. Tearful at times and voicing concerns  HEENT: Sclerae anicteric. Normocephalic  Skin:  Warm and dry.    Neck:  Supple. No JVD appreciated.  Chest: Lungs clear to auscultation. No wheezes/rhonchi/rales  Cardiovascular:  RRR. Normal S1 and S2. No murmur/gallop or rub  Abdomen:  soft, nontender, nondistended, +bowel sounds.   Extremities:  No LE edema. No clubbing or cyanosis. 2+ bilateral radial/DP pulses. Cap refill brisk    Medications:    apixaban  5 mg Oral BID    sodium chloride flush  5-40 mL IntraVENous 2 times per day      dilTIAZem 100 mg in sodium chloride 0.9 % 100 mL infusion ([facility]) Stopped (11/29/24 2111)    sodium chloride       LORazepam, sodium chloride flush, sodium chloride, ondansetron **OR** ondansetron, polyethylene glycol, acetaminophen **OR** acetaminophen    Lab Data:  CBC:   Recent Labs     11/29/24 1744 11/30/24  0435   WBC 7.7 5.7   HGB 13.2 12.2    209     BMP:    Recent Labs     11/29/24 1744 11/30/24  0435    141   K 3.8 4.7   CO2 21 24   BUN 18 15   CREATININE 0.6 0.6     LIVR:   Recent Labs     11/29/24 1744   AST 21   ALT 15     INR:    Recent Labs     11/29/24  1744   INR 1.11     APTT:   Recent Labs     11/29/24 1744   APTT 30.4     EKG: Presenting EKG with A-fib with RVR rates 160 bpm, ST wave abnormality inferiorly/anterolaterally consistent with subendocardial injury/ischemia.  Repeat EKG atrial fibrillation with RVR, nonspecific ST abnormalities, improved inferiorly and anterolaterally.    Echo:   Echocardiogram 8/9/24    Left Ventricle: Normal left ventricular systolic function. EF 3D is   65%. Left ventricle size is normal. Mild septal thickening. Normal wall   motion. Global longitudinal strain is borderline with a value of -16.7%.   Normal diastolic function.     Right Ventricle: Right ventricle size is normal. Normal systolic   function.    Mitral Valve: Mild regurgitation.     Tricuspid Valve: The estimated RVSP is 26 mmHg.

## 2024-12-01 NOTE — FLOWSHEET NOTE
Iv hub and tele discontinued reviewed instruction with patient and  verbalizes understanding of instruction and follow up care.   2

## 2024-12-02 ENCOUNTER — TELEPHONE (OUTPATIENT)
Dept: CARDIOLOGY CLINIC | Age: 59
End: 2024-12-02

## 2024-12-02 NOTE — TELEPHONE ENCOUNTER
Per NPDE Message:    need to follow up with Dr. Perez at [facility]. SHe was going to call him (that is her primary EP)     Pt is scheduled on 1.7.2025 w/ DR Perez. TARYN